# Patient Record
Sex: MALE | Race: WHITE | NOT HISPANIC OR LATINO
[De-identification: names, ages, dates, MRNs, and addresses within clinical notes are randomized per-mention and may not be internally consistent; named-entity substitution may affect disease eponyms.]

---

## 2021-09-29 ENCOUNTER — NON-APPOINTMENT (OUTPATIENT)
Age: 66
End: 2021-09-29

## 2021-09-29 ENCOUNTER — APPOINTMENT (OUTPATIENT)
Dept: PULMONOLOGY | Facility: CLINIC | Age: 66
End: 2021-09-29
Payer: MEDICARE

## 2021-09-29 VITALS — DIASTOLIC BLOOD PRESSURE: 88 MMHG | SYSTOLIC BLOOD PRESSURE: 150 MMHG

## 2021-09-29 VITALS
HEIGHT: 66 IN | OXYGEN SATURATION: 98 % | HEART RATE: 88 BPM | BODY MASS INDEX: 35.36 KG/M2 | TEMPERATURE: 98.3 F | WEIGHT: 220 LBS

## 2021-09-29 DIAGNOSIS — Z72.89 OTHER PROBLEMS RELATED TO LIFESTYLE: ICD-10-CM

## 2021-09-29 DIAGNOSIS — F19.90 OTHER PSYCHOACTIVE SUBSTANCE USE, UNSPECIFIED, UNCOMPLICATED: ICD-10-CM

## 2021-09-29 DIAGNOSIS — I51.9 HEART DISEASE, UNSPECIFIED: ICD-10-CM

## 2021-09-29 PROCEDURE — 99203 OFFICE O/P NEW LOW 30 MIN: CPT

## 2021-09-29 NOTE — HISTORY OF PRESENT ILLNESS
[TextBox_4] : 66 year old male with HTN and obesity referred for abnormal CT chest.\par He has CT chest yearly for Ao dilatation\par Last Ct chest showed a new 11 mm ovoid nodule RtLL.\par He did not have have any respiratory symptoms.\par 1 week before the Ct chest he choked with a medication pill, that was expelled after a coughing spell.\par Never diagnosed with a lung problems.\par No dyspnea on exertion.\par Used to smoke 20 years ago- < 5 pack year\par FHX: denies.\par \par Denies fever, chills, hemoptysis. No wheezes\par \par

## 2021-09-29 NOTE — PHYSICAL EXAM
[No Acute Distress] : no acute distress [Normal Appearance] : normal appearance [Normal Rate/Rhythm] : normal rate/rhythm [Normal S1, S2] : normal s1, s2 [No Resp Distress] : no resp distress [Clear to Auscultation Bilaterally] : clear to auscultation bilaterally [No Abnormalities] : no abnormalities [Normal Gait] : normal gait [No Clubbing] : no clubbing [No Cyanosis] : no cyanosis [No Focal Deficits] : no focal deficits [Oriented x3] : oriented x3 [Normal Affect] : normal affect

## 2021-10-18 ENCOUNTER — RESULT REVIEW (OUTPATIENT)
Age: 66
End: 2021-10-18

## 2022-05-18 ENCOUNTER — APPOINTMENT (OUTPATIENT)
Dept: PULMONOLOGY | Facility: CLINIC | Age: 67
End: 2022-05-18
Payer: MEDICARE

## 2022-05-18 VITALS
SYSTOLIC BLOOD PRESSURE: 150 MMHG | BODY MASS INDEX: 35.03 KG/M2 | HEART RATE: 81 BPM | OXYGEN SATURATION: 96 % | WEIGHT: 218 LBS | TEMPERATURE: 98 F | HEIGHT: 66 IN | DIASTOLIC BLOOD PRESSURE: 80 MMHG

## 2022-05-18 PROCEDURE — 99213 OFFICE O/P EST LOW 20 MIN: CPT

## 2022-05-18 NOTE — HISTORY OF PRESENT ILLNESS
[TextBox_4] : 67 year old male with a 11 mm lung nodule came for f/u.\par Had PET scan last year no uptake in the nodule.\par He is here for repeat CT.\par Also he wants to ask me about how to make an appointment with vascular surgery here at [place] because he has a referral fro [facility]\par Denies respiratory complaints\par Not smoking\par No cough, no hemoptysis\par \par

## 2022-05-18 NOTE — PHYSICAL EXAM
[No Acute Distress] : no acute distress [Normal Rate/Rhythm] : normal rate/rhythm [Normal S1, S2] : normal s1, s2 [No Resp Distress] : no resp distress [Clear to Auscultation Bilaterally] : clear to auscultation bilaterally [No Abnormalities] : no abnormalities [Normal Gait] : normal gait [No Clubbing] : no clubbing [No Cyanosis] : no cyanosis [No Focal Deficits] : no focal deficits [Oriented x3] : oriented x3 [Normal Affect] : normal affect

## 2022-06-02 ENCOUNTER — RESULT REVIEW (OUTPATIENT)
Age: 67
End: 2022-06-02

## 2022-06-28 ENCOUNTER — APPOINTMENT (OUTPATIENT)
Dept: VASCULAR SURGERY | Facility: CLINIC | Age: 67
End: 2022-06-28

## 2022-06-28 VITALS — SYSTOLIC BLOOD PRESSURE: 162 MMHG | HEART RATE: 87 BPM | DIASTOLIC BLOOD PRESSURE: 103 MMHG

## 2022-06-28 PROCEDURE — 93922 UPR/L XTREMITY ART 2 LEVELS: CPT

## 2022-06-28 PROCEDURE — 99204 OFFICE O/P NEW MOD 45 MIN: CPT

## 2022-08-22 NOTE — PHYSICAL EXAM
[Normal Thyroid] : the thyroid was normal [JVD] : no jugular venous distention  [de-identified] : NAD. Awake and alert [de-identified] : ncat. EOMI  [FreeTextEntry1] : 2+ bilateral femoral pulses.  Left lower extremity 2+ popliteal and DP PT pulses.  Right lower extremity nonpalpable pedal pulses.  Triphasic signals.\par No edema. No VVs.  [de-identified] : Soft, NT, ND. No guarding. No palpable masses. No HSM [de-identified] : Normal muscle bulk and tone. FROM in all extremities.\par  [de-identified] : AAOx3, CN II-XII intact. Strength 5/5 in all 4 extremities. Sensation intact throughout.

## 2022-08-22 NOTE — ASSESSMENT
[FreeTextEntry1] : 67-year-old male with past medical history of PAD with right lower extremity claudication, smoking, multiple other medical problems.  Really discussed with the patient at length natural history of PAD, treatment options including exercise therapy, angioplasty and stenting, bypass surgery.  We discussed that he is a good candidate for exercise program.  Patient will take aspirin 81 mg daily.  He will continue atorvastatin.  He will quit smoking I discussed with his PCP Shalini.\par Cilostazol 100 mg twice daily.\par Patient's ABIs ordered and reviewed.\par Follow-up in 3 months or sooner as needed.\par \par Time spent 50 minutes.

## 2022-08-22 NOTE — REVIEW OF SYSTEMS
[As noted in HPI] : as noted in HPI [Leg Claudication] : intermittent leg claudication [As Noted in HPI] : as noted in HPI [Limb Pain] : limb pain [Negative] : Heme/Lymph [Lower Ext Edema] : no extremity edema [Arthralgias] : no arthralgias [Limb Swelling] : no limb swelling [FreeTextEntry9] :  back pain and neuralgias

## 2022-08-22 NOTE — HISTORY OF PRESENT ILLNESS
[FreeTextEntry1] : 67-year-old male with past medical history of hypertension, hyperlipidemia, CAD status post coronary stents x3, severe spinal stenosis status post lumbar spine laminectomy and fusion presented for an evaluation of right lower extremity claudication.  Patient states that his symptoms started at least a few months ago.  Claudication with long distance more pronounced when ambulating up hill.  He ambulates 3 miles daily with his wife and flat surface with intermittent need to stop and rest.  Cramping in the calf goes away with rest.  He also has a separate left lower extremity neuralgia from history of spine surgery.  He denies rest pain.  Denies ulceration.  He is on aspirin and Plavix for coronary stents as well as on statin and blood pressure medications.\par \par Patient is a former smoker.\par \par He recently moved from the [place] where most of his physicians are including Dr. Charles Du ([phone]) and Dr. Cyrus Castillo ().

## 2022-09-27 ENCOUNTER — APPOINTMENT (OUTPATIENT)
Dept: VASCULAR SURGERY | Facility: CLINIC | Age: 67
End: 2022-09-27

## 2022-09-27 VITALS — BODY MASS INDEX: 36.16 KG/M2 | HEIGHT: 66 IN | WEIGHT: 225 LBS

## 2022-09-27 PROCEDURE — 99214 OFFICE O/P EST MOD 30 MIN: CPT

## 2022-09-27 PROCEDURE — 93922 UPR/L XTREMITY ART 2 LEVELS: CPT

## 2022-11-01 ENCOUNTER — APPOINTMENT (OUTPATIENT)
Dept: VASCULAR SURGERY | Facility: CLINIC | Age: 67
End: 2022-11-01

## 2022-11-01 PROCEDURE — 93922 UPR/L XTREMITY ART 2 LEVELS: CPT

## 2022-11-01 NOTE — ASSESSMENT
[FreeTextEntry1] : 67-year-old male with past medical history of PAD with right lower extremity claudication, smoking, multiple other medical problems.  Really discussed with the patient at length natural history of PAD, treatment options including exercise therapy, angioplasty and stenting, bypass surgery.  We discussed that he is a good candidate for exercise program.  Patient will take aspirin 81 mg daily.  He will continue atorvastatin. \par Cilostazol 100 mg twice daily.\par Patient's repeat  ABIs ordered and reviewed.  No change in ABIs.  Patient is LAURA of 0.8 is not consistent with severity of patient's claudication.\par Will obtain exercise LAURA to further assess patient's PAD.\par Follow-up in 3 months or sooner as needed.\par \par Time spent 39 minutes.

## 2022-11-01 NOTE — PHYSICAL EXAM
[Normal Thyroid] : the thyroid was normal [Calm] : calm [JVD] : no jugular venous distention  [de-identified] : NAD. Awake and alert [de-identified] : ncat. EOMI  [FreeTextEntry1] : 2+ bilateral femoral pulses.  Left lower extremity 2+ popliteal and DP PT pulses.  Right lower extremity nonpalpable pedal pulses.  Triphasic signals.\par No edema. No VVs.  [de-identified] : Soft, NT, ND. No guarding. No palpable masses. No HSM [de-identified] : Normal muscle bulk and tone. FROM in all extremities.\par  [de-identified] : AAOx3, CN II-XII intact. Strength 5/5 in all 4 extremities. Sensation on the lateral aspect of left lower extremity.  Otherwise intact

## 2022-11-01 NOTE — HISTORY OF PRESENT ILLNESS
[FreeTextEntry1] : 67-year-old male with past medical history of hypertension, hyperlipidemia, CAD status post coronary stents x3, severe spinal stenosis status post lumbar spine laminectomy and fusion presented for an evaluation of right lower extremity claudication.  Patient states that his symptoms started at least a few months ago.  Claudication with long distance more pronounced when ambulating up hill.  He ambulates 3 miles daily with his wife and flat surface with intermittent need to stop and rest.  Cramping in the calf goes away with rest.  He also has a separate left lower extremity neuralgia from history of spine surgery.  He denies rest pain.  Denies ulceration.  He is on aspirin and Plavix for coronary stents as well as on statin and blood pressure medications.\par \par Patient is a former smoker.\par \par He recently moved from the [place] where most of his physicians are including Dr. Charles Du ([phone]) and Dr. Cyrus Castillo ().  [de-identified] : 67-year-old male presented for follow-up for intermittent claudication with mildly diminished ABIs.  Since last visit patient was compliant with ambulation however continues to have short distance claudication is without improvement of his symptoms with exercise therapy.  He also swims routinely.  Patient is active and exercises daily with weights.  His claudication is lifestyle limiting and very disabling as he watches his 2 young grandkids and is very active.  Patient was compliant with best medical management.\par He has been a non-smoker for 18 years.

## 2022-11-04 ENCOUNTER — APPOINTMENT (OUTPATIENT)
Dept: VASCULAR SURGERY | Facility: CLINIC | Age: 67
End: 2022-11-04

## 2023-02-10 ENCOUNTER — APPOINTMENT (OUTPATIENT)
Dept: VASCULAR SURGERY | Facility: CLINIC | Age: 68
End: 2023-02-10
Payer: MEDICARE

## 2023-02-10 PROCEDURE — 99214 OFFICE O/P EST MOD 30 MIN: CPT

## 2023-03-28 ENCOUNTER — APPOINTMENT (OUTPATIENT)
Dept: FAMILY MEDICINE | Facility: CLINIC | Age: 68
End: 2023-03-28
Payer: MEDICARE

## 2023-03-28 VITALS
SYSTOLIC BLOOD PRESSURE: 140 MMHG | OXYGEN SATURATION: 95 % | WEIGHT: 225 LBS | TEMPERATURE: 98 F | HEIGHT: 66 IN | HEART RATE: 85 BPM | DIASTOLIC BLOOD PRESSURE: 90 MMHG | BODY MASS INDEX: 36.16 KG/M2

## 2023-03-28 PROCEDURE — G0439: CPT

## 2023-03-28 PROCEDURE — 36415 COLL VENOUS BLD VENIPUNCTURE: CPT

## 2023-03-28 PROCEDURE — 99213 OFFICE O/P EST LOW 20 MIN: CPT | Mod: 25

## 2023-03-28 RX ORDER — CILOSTAZOL 100 MG/1
100 TABLET ORAL TWICE DAILY
Qty: 180 | Refills: 0 | Status: DISCONTINUED | COMMUNITY
Start: 2022-09-27 | End: 2023-03-28

## 2023-03-28 RX ORDER — GABAPENTIN 100 MG/1
100 CAPSULE ORAL TWICE DAILY
Qty: 60 | Refills: 2 | Status: DISCONTINUED | COMMUNITY
Start: 2022-09-27 | End: 2023-03-28

## 2023-03-29 LAB
ALBUMIN SERPL ELPH-MCNC: 4.9 G/DL
ALP BLD-CCNC: 80 U/L
ALT SERPL-CCNC: 26 U/L
ANION GAP SERPL CALC-SCNC: 16 MMOL/L
AST SERPL-CCNC: 21 U/L
BASOPHILS # BLD AUTO: 0.12 K/UL
BASOPHILS NFR BLD AUTO: 1.5 %
BILIRUB SERPL-MCNC: 0.3 MG/DL
BUN SERPL-MCNC: 18 MG/DL
CALCIUM SERPL-MCNC: 10 MG/DL
CHLORIDE SERPL-SCNC: 101 MMOL/L
CHOLEST SERPL-MCNC: 122 MG/DL
CO2 SERPL-SCNC: 23 MMOL/L
CREAT SERPL-MCNC: 0.76 MG/DL
EGFR: 100 ML/MIN/1.73M2
EOSINOPHIL # BLD AUTO: 0.42 K/UL
EOSINOPHIL NFR BLD AUTO: 5.2 %
ESTIMATED AVERAGE GLUCOSE: 128 MG/DL
GLUCOSE SERPL-MCNC: 102 MG/DL
HBA1C MFR BLD HPLC: 6.1 %
HCT VFR BLD CALC: 45.3 %
HDLC SERPL-MCNC: 39 MG/DL
HGB BLD-MCNC: 15.1 G/DL
IMM GRANULOCYTES NFR BLD AUTO: 0.9 %
LDLC SERPL CALC-MCNC: 41 MG/DL
LYMPHOCYTES # BLD AUTO: 1.42 K/UL
LYMPHOCYTES NFR BLD AUTO: 17.6 %
MAN DIFF?: NORMAL
MCHC RBC-ENTMCNC: 28.7 PG
MCHC RBC-ENTMCNC: 33.3 GM/DL
MCV RBC AUTO: 86.1 FL
MONOCYTES # BLD AUTO: 0.41 K/UL
MONOCYTES NFR BLD AUTO: 5.1 %
NEUTROPHILS # BLD AUTO: 5.63 K/UL
NEUTROPHILS NFR BLD AUTO: 69.7 %
NONHDLC SERPL-MCNC: 83 MG/DL
PLATELET # BLD AUTO: 343 K/UL
POTASSIUM SERPL-SCNC: 4.4 MMOL/L
PROT SERPL-MCNC: 8.1 G/DL
PSA FREE FLD-MCNC: 17 %
PSA FREE SERPL-MCNC: 0.51 NG/ML
PSA SERPL-MCNC: 3.07 NG/ML
RBC # BLD: 5.26 M/UL
RBC # FLD: 12.9 %
SODIUM SERPL-SCNC: 140 MMOL/L
TRIGL SERPL-MCNC: 210 MG/DL
TSH SERPL-ACNC: 2.96 UIU/ML
WBC # FLD AUTO: 8.07 K/UL

## 2023-04-03 NOTE — HEALTH RISK ASSESSMENT
[Good] : ~his/her~  mood as  good [Yes] : Yes [2 - 4 times a month (2 pts)] : 2-4 times a month (2 points) [No] : In the past 12 months have you used drugs other than those required for medical reasons? No [1] : 1) Little interest or pleasure doing things for several days (1) [0] : 2) Feeling down, depressed, or hopeless: Not at all (0) [PHQ-2 Negative - No further assessment needed] : PHQ-2 Negative - No further assessment needed [Patient reported colonoscopy was normal] : Patient reported colonoscopy was normal [With Significant Other] : lives with significant other [Retired] : retired [] :  [Fully functional (bathing, dressing, toileting, transferring, walking, feeding)] : Fully functional (bathing, dressing, toileting, transferring, walking, feeding) [Fully functional (using the telephone, shopping, preparing meals, housekeeping, doing laundry, using] : Fully functional and needs no help or supervision to perform IADLs (using the telephone, shopping, preparing meals, housekeeping, doing laundry, using transportation, managing medications and managing finances) [Former] : Former [FreeTextEntry1] : None [1 or 2 (0 pts)] : 1 or 2 (0 points) [Never (0 pts)] : Never (0 points) [No falls in past year] : Patient reported no falls in the past year [Audit-CScore] : 2 [de-identified] : Cardio, strength training [de-identified] : None [[id number]] : 1 [HIV test declined] : HIV test declined [Hepatitis C test declined] : Hepatitis C test declined [Change in mental status noted] : No change in mental status noted [None] : None [# of Members in Household ___] :  household currently consist of [unfilled] member(s) [Reports changes in hearing] : Reports no changes in hearing [Reports changes in vision] : Reports no changes in vision [Reports changes in dental health] : Reports no changes in dental health [ColonoscopyDate] : 01/15 [ColonoscopyComments] : Dr. Isaac Navarro [de-identified] : 2005

## 2023-04-03 NOTE — HISTORY OF PRESENT ILLNESS
[FreeTextEntry1] : Annual and to establish care  [de-identified] : 67 year old male with pmh of HTN, HLD, CAD s/p stents x 3, severe spinal stenosis s/p lumbar spine laminectomy and fusion, anxiety and depression, lung nodule, PAD presenting to establish care and for an annual physical exam. \par Patient moved from the [place] where most of his physicians were including his pcp, Dr. Charles Wong and cardiologist, Dr. Cyrus Castillo. He also follows with urologist Dr. Harmon. \par He has recently seen Dr. Le for PAD and was recommended to exercise and f/u in 3 months. \par Patient see Dr. Barragan for the pulmonary nodule. \par He is on xanax 2mg TID and saw a psychiatrist and therapist in the past but hasn't seen anyone recently. He complaints of some memory problems recently. \par  presented for an evaluation of right lower extremity claudication. Patient states that his symptoms started at least a few months ago. Claudication with long distance more pronounced when ambulating up hill. He ambulates 3 miles daily with his wife and flat surface with intermittent need to stop and rest. Cramping in the calf goes away with rest. He also has a separate left lower extremity neuralgia from history of spine surgery. He denies rest pain. Denies ulceration. He is on aspirin and Plavix for coronary stents as well as on statin and blood pressure medication

## 2023-05-22 ENCOUNTER — APPOINTMENT (OUTPATIENT)
Dept: FAMILY MEDICINE | Facility: CLINIC | Age: 68
End: 2023-05-22
Payer: MEDICARE

## 2023-05-22 VITALS
BODY MASS INDEX: 36.16 KG/M2 | OXYGEN SATURATION: 98 % | HEART RATE: 76 BPM | SYSTOLIC BLOOD PRESSURE: 140 MMHG | HEIGHT: 66 IN | DIASTOLIC BLOOD PRESSURE: 80 MMHG | TEMPERATURE: 80 F | WEIGHT: 225 LBS

## 2023-05-22 PROCEDURE — 99213 OFFICE O/P EST LOW 20 MIN: CPT

## 2023-05-24 ENCOUNTER — APPOINTMENT (OUTPATIENT)
Dept: VASCULAR SURGERY | Facility: CLINIC | Age: 68
End: 2023-05-24
Payer: MEDICARE

## 2023-05-24 VITALS — HEART RATE: 67 BPM | DIASTOLIC BLOOD PRESSURE: 91 MMHG | SYSTOLIC BLOOD PRESSURE: 151 MMHG

## 2023-05-24 PROCEDURE — 99214 OFFICE O/P EST MOD 30 MIN: CPT

## 2023-05-24 NOTE — PHYSICAL EXAM
[Normal Thyroid] : the thyroid was normal [0] : right 0 [2+] : left 2+ [Ankle Swelling Bilaterally] : bilaterally  [Alert] : alert [Oriented to Person] : oriented to person [Oriented to Place] : oriented to place [Oriented to Time] : oriented to time [Calm] : calm [JVD] : no jugular venous distention  [Ankle Swelling (On Exam)] : not present [Varicose Veins Of Lower Extremities] : not present [de-identified] : Awake and alert [de-identified] : No gross motor or sensory deficits. [de-identified] : Appropriate affect.

## 2023-05-24 NOTE — ASSESSMENT
[FreeTextEntry1] : 68-year-old male with  right lower extremity claudication.  He continues to experience calf pain on the right despite especially when walking uphill. He denies pain when ambulating downhill or on flat ground. He is walking approximately 2 miles several times a week.  He was unable to tolerate Pletal in the past.  I recommended that he continue conservative treatment with daily ambulation for at least 1 hour and risk factor modification for now with aspirin 81 mg and a Plavix daily. I strongly recommended against arterial intervention until the symptoms become disabling. \par \par \par Follow-up in 3 months or sooner as needed.\par \par

## 2023-05-24 NOTE — HISTORY OF PRESENT ILLNESS
[FreeTextEntry1] : 67-year-old male with past medical history of hypertension, hyperlipidemia, CAD status post coronary stents x3, severe spinal stenosis status post lumbar spine laminectomy and fusion presented for an evaluation of right lower extremity claudication.  Patient states that his symptoms started at least a few months ago.  Claudication with long distance more pronounced when ambulating up hill.  He ambulates 3 miles daily with his wife and flat surface with intermittent need to stop and rest.  Cramping in the calf goes away with rest.  He also has a separate left lower extremity neuralgia from history of spine surgery.  He denies rest pain.  Denies ulceration.  He is on aspirin and Plavix for coronary stents as well as on statin and blood pressure medications.\par \par Patient is a former smoker.\par \par He recently moved from the [place] where most of his physicians are including Dr. Charles Du ([phone]) and Dr. Cyrus Castillo ().  [de-identified] : 68-year-old male returns in follow-up for intermittent claudication on the right. Patient continues to ambulate daily without improvement of his symptoms. The patient reports some days he can walk 2 miles with little pain but other days he develops significant pain with short-distances. He reports that his symptoms worsens when he walks up-hill. The pain is alleviated with rest. The patient reports that he is starting to experience mild pain on the left as well. His claudication is currently not  lifestyle limiting.\par

## 2023-05-24 NOTE — REVIEW OF SYSTEMS
[As noted in HPI] : as noted in HPI [Leg Claudication] : intermittent leg claudication [As Noted in HPI] : as noted in HPI [Limb Pain] : limb pain [Negative] : Heme/Lymph [Fever] : no fever [Chills] : no chills [Lower Ext Edema] : no extremity edema [Arthralgias] : no arthralgias [Limb Swelling] : no limb swelling [Skin Lesions] : no skin lesions [Skin Wound] : no skin wound [FreeTextEntry9] :  back pain and neuralgias

## 2023-05-26 ENCOUNTER — APPOINTMENT (OUTPATIENT)
Dept: PULMONOLOGY | Facility: CLINIC | Age: 68
End: 2023-05-26
Payer: MEDICARE

## 2023-05-26 VITALS
SYSTOLIC BLOOD PRESSURE: 142 MMHG | DIASTOLIC BLOOD PRESSURE: 82 MMHG | HEIGHT: 66 IN | BODY MASS INDEX: 36.16 KG/M2 | WEIGHT: 225 LBS | HEART RATE: 75 BPM | OXYGEN SATURATION: 95 %

## 2023-05-26 DIAGNOSIS — R91.1 SOLITARY PULMONARY NODULE: ICD-10-CM

## 2023-05-26 PROCEDURE — 99214 OFFICE O/P EST MOD 30 MIN: CPT

## 2023-05-26 NOTE — REVIEW OF SYSTEMS
[Recent Wt Gain (___ Lbs)] : ~T recent [unfilled] lb weight gain [Nocturia] : nocturia [Negative] : Neurologic [TextBox_30] : snoring

## 2023-05-26 NOTE — HISTORY OF PRESENT ILLNESS
[TextBox_4] : 68 year old male with lung nodule came for f/u.\par Last seen in May 2022\par Had another CT chest in June 2022 seen, my read: decrease in size of the RtLL nodule, CT findings c/w intrapulmonary LN\par \par Feels OK. No respiratory complaints. Has gained 5 lbs since last year.\par he snores at night. Has nocturia.\par Takes naps.\par BT 10 pm\par WT 9 am\par Feels tired in the daytime\par His PMD ordered a sleep study but he did not like the facility and he did not go.\par \par

## 2023-05-26 NOTE — PHYSICAL EXAM
[No Acute Distress] : no acute distress [II] : Mallampati Class: II [Normal Appearance] : normal appearance [Normal Rate/Rhythm] : normal rate/rhythm [Normal S1, S2] : normal s1, s2 [No Resp Distress] : no resp distress [Clear to Auscultation Bilaterally] : clear to auscultation bilaterally [No Abnormalities] : no abnormalities [Normal Gait] : normal gait [No Clubbing] : no clubbing [No Cyanosis] : no cyanosis [No Focal Deficits] : no focal deficits [Oriented x3] : oriented x3 [Normal Affect] : normal affect [TextBox_89] : obese

## 2023-06-08 RX ORDER — AMLODIPINE BESYLATE 10 MG/1
10 TABLET ORAL
Refills: 0 | Status: ACTIVE | COMMUNITY
Start: 2023-06-08

## 2023-06-08 RX ORDER — ATORVASTATIN CALCIUM 20 MG/1
20 TABLET, FILM COATED ORAL
Refills: 0 | Status: ACTIVE | COMMUNITY
Start: 2023-05-24

## 2023-06-08 RX ORDER — TELMISARTAN 80 MG/1
80 TABLET ORAL DAILY
Qty: 90 | Refills: 3 | Status: ACTIVE | COMMUNITY
Start: 2023-06-08

## 2023-06-08 RX ORDER — B-COMPLEX WITH VITAMIN C
TABLET ORAL
Refills: 0 | Status: ACTIVE | COMMUNITY
Start: 2023-06-08

## 2023-06-08 RX ORDER — CHOLECALCIFEROL (VITAMIN D3) 50 MCG
50 MCG TABLET ORAL
Refills: 0 | Status: ACTIVE | COMMUNITY
Start: 2023-06-08

## 2023-06-08 RX ORDER — METHYLDOPA/HYDROCHLOROTHIAZIDE 250MG-15MG
TABLET ORAL
Refills: 0 | Status: ACTIVE | COMMUNITY
Start: 2023-06-08

## 2023-06-08 RX ORDER — MULTIVITAMIN
TABLET ORAL
Refills: 0 | Status: ACTIVE | COMMUNITY
Start: 2023-06-08

## 2023-06-08 RX ORDER — OMEGA-3/DHA/EPA/FISH OIL 1200 MG
1200 CAPSULE ORAL
Refills: 0 | Status: ACTIVE | COMMUNITY
Start: 2023-06-08

## 2023-06-08 RX ORDER — METOPROLOL TARTRATE 100 MG/1
100 TABLET, FILM COATED ORAL
Refills: 0 | Status: ACTIVE | COMMUNITY
Start: 2023-05-24

## 2023-06-08 RX ORDER — CLOPIDOGREL 75 MG/1
75 TABLET, FILM COATED ORAL
Refills: 0 | Status: ACTIVE | COMMUNITY
Start: 2023-05-24

## 2023-06-08 RX ORDER — ELECTROLYTES/DEXTROSE
450 MG SOLUTION, ORAL ORAL
Refills: 0 | Status: ACTIVE | COMMUNITY
Start: 2023-06-08

## 2023-06-08 NOTE — HISTORY OF PRESENT ILLNESS
[FreeTextEntry1] : Follow up  [de-identified] : 68 year old male with pmh of HTN, HLD, CAD s/p stenting, PAD, Depression and anxiety, pulmonary nodules, spinal stenosis presenting for a follow up. Patient is on xanax and prozac for depression and anxiety. Has been having memory problems and is trying to cut down on xanax. He is well otherwise.

## 2023-07-12 ENCOUNTER — APPOINTMENT (OUTPATIENT)
Dept: GASTROENTEROLOGY | Facility: CLINIC | Age: 68
End: 2023-07-12
Payer: MEDICARE

## 2023-07-12 VITALS — HEIGHT: 66 IN | WEIGHT: 231 LBS | BODY MASS INDEX: 37.12 KG/M2

## 2023-07-12 DIAGNOSIS — Z12.11 ENCOUNTER FOR SCREENING FOR MALIGNANT NEOPLASM OF COLON: ICD-10-CM

## 2023-07-12 PROCEDURE — 99204 OFFICE O/P NEW MOD 45 MIN: CPT

## 2023-07-12 NOTE — ASSESSMENT
[FreeTextEntry1] : Screening colonoscopy , pending cardiac evaluation\par Risks (including but not limited to sedation risks, infection, bleeding, perforation, possibility of missed lesions), benefits and alternatives to procedure, including not doing the procedure, were discussed with patient. Patient understood and agreed to proceed with colonoscopy. \par Colonoscopy preparation instructions reviewed with patient.\par \par hold plavix x 5 d pending approval from cardiologist. , continue asa 81 mg daily. \par

## 2023-07-12 NOTE — HISTORY OF PRESENT ILLNESS
[FreeTextEntry1] : 68 year M  htn, hld, CAD/ s/p stents x 3 last 10 years ago, Plavix/asa, lung nodule, high risk for CHAD, PAD/RLE claudication on asa/plavix, back surgery\par presents for evaluation of colon cancer screening \par He is seen at the request of Dr. Anthony Lomas. \par \par \par colonoscopy 8 years ago, in the [place],  told to repeat in 5 y\par \par takes probiotics for digestion, \par regular bm 1-2 x per day\par \par \par Patient denies abdominal pain , n/v/heartburn, reflux, dysphagia/odynophagia, change in bowel habits, diarrhea, constipation, brbpr, melena. no weight loss.  Good appetite and energy level. Patient has daily BM, denies regular NSAID use. \par \par retired [employer]\par etOH "too much" will be seeing hepatologist, trying to cut back \par \par fam hx neg for colon polyps, colon cancer\par \par cardiologist- Dr. Castillo, the [place] -[phone]-f/u scheduled in 08/2023- stress test to be scheduled.

## 2023-07-12 NOTE — PHYSICAL EXAM
[Alert] : alert [Normal Voice/Communication] : normal voice/communication [Healthy Appearing] : healthy appearing [No Acute Distress] : no acute distress [Sclera] : the sclera and conjunctiva were normal [Hearing Threshold Finger Rub Not [place]] : hearing was normal [Normal Lips/Gums] : the lips and gums were normal [Normal Appearance] : the appearance of the neck was normal [Oropharynx] : the oropharynx was normal [No Neck Mass] : no neck mass was observed [No Respiratory Distress] : no respiratory distress [Bowel Sounds] : normal bowel sounds [Abdomen Tenderness] : non-tender [No Masses] : no abdominal mass palpated [Abdomen Soft] : soft [] : no hepatosplenomegaly [Oriented To Time, Place, And Person] : oriented to person, place, and time [Normal PMI] : the apical impulse was abnormal [No CVA Tenderness] : no CVA  tenderness [Abnormal Walk] : normal gait [Normal Color / Pigmentation] : normal skin color and pigmentation

## 2023-07-20 ENCOUNTER — APPOINTMENT (OUTPATIENT)
Dept: HEPATOLOGY | Facility: CLINIC | Age: 68
End: 2023-07-20
Payer: MEDICARE

## 2023-07-20 VITALS
WEIGHT: 225 LBS | OXYGEN SATURATION: 98 % | RESPIRATION RATE: 18 BRPM | BODY MASS INDEX: 36.16 KG/M2 | SYSTOLIC BLOOD PRESSURE: 130 MMHG | HEIGHT: 66 IN | DIASTOLIC BLOOD PRESSURE: 80 MMHG | HEART RATE: 69 BPM

## 2023-07-20 DIAGNOSIS — Z13.1 ENCOUNTER FOR SCREENING FOR DIABETES MELLITUS: ICD-10-CM

## 2023-07-20 PROCEDURE — 99214 OFFICE O/P EST MOD 30 MIN: CPT

## 2023-07-20 PROCEDURE — 99204 OFFICE O/P NEW MOD 45 MIN: CPT

## 2023-07-20 RX ORDER — ASPIRIN ENTERIC COATED TABLETS 81 MG 81 MG/1
81 TABLET, DELAYED RELEASE ORAL
Refills: 0 | Status: ACTIVE | COMMUNITY

## 2023-07-20 NOTE — HISTORY OF PRESENT ILLNESS
[de-identified] : Mr. QUICK is a 68 year old man with anxiety and depression, obesity, HTN, HLD, CAD/ s/p stents x 3 last 10 years ago, Plavix/asa, thoracic aortic aneurysm 4.4 cm, lung nodule, high risk for CHAD, PAD/RLE claudication on asa/plavix, back surgery, who drinks beer and saw Dr. Pierce several times at [facility] because of fatty liver with scarring.\par He tells me that he had a little cyst in the pancreas that is now gone.\par He and his wife have been unable to lose weight. Eats healthy and exercises, has extensive exercise equipment at home with they use frequently every week. Swims daily now in the summer. Lost abdominal girth, but gained 5 lbs, likely muscle\par Meals: cereal for breakfast, no lunch, dinner.\par LFTs were normal in March 2023,  G/L. HbA1c 6.1%, . \par \par SHx: retired [employer]. . \par \par Alcohol history: drinks off and on, reduced it in 2023 to 3 days per week, 6 beers on those days, for several weeks, then does not drink for a month. Drank same before, but 10 beers twice a month. Never had withdrawal. \par Weight history: 225 lbs/BMI 36.3 in 7/2023. Max weight is this - stable for many years. \par Remedies/OTC meds:\par \par ROS: \par Constitutional: no fever, fatigue, weight gain/loss. Eyes: no eye pain or discharge. ENT: no nosebleed, earache, change in hearing. CVS: denies chest pain, palpitations, claudication, irregular heartbeat. Resp: denies SOB, wheezing, cough, SOB on exertion. GI: denies abdominal pain, vomiting, diarrhea, heartburn, melena. Genitourinary: denies dysuria, incontinence. Musculoskeletal: joint pain, trigger fingers both middle fingers. Integumentary: denies pruritus, skin lesions, rash. Neuro: denies confusion, dizziness, fainting. Psych: has anxiety, depression, change in personality. Endo: denies muscle weakness. Heme/lymph: denies easy bleeding and bruising.\par \par Workup:\par - Chest CTs in our EMR\par - colonoscopy: around 2015, in the [place], told to repeat in 5 y; saw Dr. Landry\par \par Physical exam:\par Gen: A&Ox3, NAD\par HEENT: normal outer ears & nose, PERRL, mucus membranes moist, anicteric, no lymphadenopathy\par CVS: regular rate and rhythm, no murmur\par Pulm: vesicular breath sounds\par Abdomen: normal bowel sounds, soft, nontender, no hepatosplenomegaly \par Legs: no edema, no clubbing\par Musculoskeletal: normal gait\par Skin: no rash\par Neuro: no asterixis, EOMI\par Psych: normal affect

## 2023-07-20 NOTE — ASSESSMENT
[FreeTextEntry1] : Mr. QUICK is a 68 year old man with anxiety and depression, obesity, HTN, HLD, CAD/ s/p stents x 3 last 10 years ago, Plavix/asa, thoracic aortic aneurysm 4.4 cm, lung nodule, high risk for CHAD, PAD/RLE claudication on asa/plavix, back surgery, who drinks beer and saw Dr. Pierce several times at [facility] because of fatty liver with scarring.\par \par - fatty liver - NAFLD/MICHELA\par   - prior ultrasound and fibroscans with Dr. Pierce at [facility]\par   - hepatomegaly and mild R Dupuytren's contracture on exam\par - severe obesity, BMI 36\par - binge drinking - recently 6 beers three times a week, in past also 10 beers twice a month\par - HLD\par \par - coffee use: 2-3 cups/day\par \par I discussed that semaglutide helps to improve diabetes type 2, obesity, and BLACKMON. I discussed that, when used in addition to exercise and reduced caloric diet, weight loss in patients with obesity was 15% without diabetes and 10% with diabetes after 68 weeks ([place] et al., NEJ 2021; Neal et al., Lancet 2021). I explained that it has been shown to lead to greater weight loss than liraglutide (Jacinda et al., N Engl J Med 2020; [place] et al., Lancet 2018). Main possible side effects are nausea and other gastrointestinal symptoms. I discussed that a rare type of thyroid cancer, medullary/C-cell tyroid cancer, have been found in animals treated with this class of medications. The patient does not have a family history of this cancer.\par \par \par \par Plan:\par - bloodwork  today\par - advised to stop drinking beer\par - US abdomen\par - fibroscan\par - he will provide records from [facility], including hepatology note, reports of ultrasound and fibroscan\par - start Wegovy 0.25 mg/week\par - return in 4-6 weeks

## 2023-08-02 LAB
A1AT PHENOTYP SERPL-IMP: NORMAL
A1AT SERPL-MCNC: 167 MG/DL
ALBUMIN SERPL ELPH-MCNC: 5.2 G/DL
ALP BLD-CCNC: 71 U/L
ALT SERPL-CCNC: 57 U/L
ANA SER IF-ACNC: NEGATIVE
ANION GAP SERPL CALC-SCNC: 15 MMOL/L
AST SERPL-CCNC: 53 U/L
BILIRUB SERPL-MCNC: 0.6 MG/DL
BUN SERPL-MCNC: 12 MG/DL
CALCIUM SERPL-MCNC: 10.1 MG/DL
CERULOPLASMIN SERPL-MCNC: 25 MG/DL
CHLORIDE SERPL-SCNC: 101 MMOL/L
CHOLEST SERPL-MCNC: 124 MG/DL
CO2 SERPL-SCNC: 20 MMOL/L
CREAT SERPL-MCNC: 0.79 MG/DL
EGFR: 98 ML/MIN/1.73M2
ESTIMATED AVERAGE GLUCOSE: 126 MG/DL
FERRITIN SERPL-MCNC: 19 NG/ML
GGT SERPL-CCNC: 49 U/L
GLUCOSE SERPL-MCNC: 109 MG/DL
HBA1C MFR BLD HPLC: 6 %
HBV CORE IGG+IGM SER QL: NONREACTIVE
HBV SURFACE AB SER QL: NONREACTIVE
HBV SURFACE AG SER QL: NONREACTIVE
HCV AB SER QL: NONREACTIVE
HCV S/CO RATIO: 0.05 S/CO
HDLC SERPL-MCNC: 45 MG/DL
HEPATITIS A IGG ANTIBODY: NONREACTIVE
INR PPP: 1.08 RATIO
IRON SATN MFR SERPL: 9 %
IRON SERPL-MCNC: 47 UG/DL
LDLC SERPL CALC-MCNC: 56 MG/DL
MITOCHONDRIA AB SER IF-ACNC: NORMAL
NONHDLC SERPL-MCNC: 80 MG/DL
POTASSIUM SERPL-SCNC: 4.2 MMOL/L
PROT SERPL-MCNC: 8.5 G/DL
PT BLD: 12.5 SEC
SMOOTH MUSCLE AB SER QL IF: NORMAL
SODIUM SERPL-SCNC: 136 MMOL/L
TIBC SERPL-MCNC: 526 UG/DL
TRIGL SERPL-MCNC: 136 MG/DL
UIBC SERPL-MCNC: 478 UG/DL

## 2023-08-08 ENCOUNTER — NON-APPOINTMENT (OUTPATIENT)
Age: 68
End: 2023-08-08

## 2023-08-22 ENCOUNTER — APPOINTMENT (OUTPATIENT)
Dept: FAMILY MEDICINE | Facility: CLINIC | Age: 68
End: 2023-08-22
Payer: MEDICARE

## 2023-08-22 VITALS
DIASTOLIC BLOOD PRESSURE: 76 MMHG | BODY MASS INDEX: 36.16 KG/M2 | OXYGEN SATURATION: 98 % | HEART RATE: 65 BPM | HEIGHT: 66 IN | SYSTOLIC BLOOD PRESSURE: 128 MMHG | WEIGHT: 225 LBS | TEMPERATURE: 97.4 F

## 2023-08-22 PROCEDURE — 99214 OFFICE O/P EST MOD 30 MIN: CPT

## 2023-08-22 RX ORDER — ALBUTEROL 90 MCG
90 AEROSOL (GRAM) INHALATION
Refills: 0 | Status: ACTIVE | COMMUNITY

## 2023-08-22 RX ORDER — ALBUTEROL SULFATE 90 UG/1
108 (90 BASE) INHALANT RESPIRATORY (INHALATION)
Qty: 1 | Refills: 5 | Status: ACTIVE | COMMUNITY
Start: 2023-08-22 | End: 1900-01-01

## 2023-08-22 RX ORDER — GABAPENTIN 100 MG/1
100 CAPSULE ORAL TWICE DAILY
Refills: 0 | Status: DISCONTINUED | COMMUNITY
Start: 2023-05-23 | End: 2023-08-22

## 2023-08-23 ENCOUNTER — APPOINTMENT (OUTPATIENT)
Dept: VASCULAR SURGERY | Facility: CLINIC | Age: 68
End: 2023-08-23
Payer: MEDICARE

## 2023-08-23 VITALS — DIASTOLIC BLOOD PRESSURE: 94 MMHG | SYSTOLIC BLOOD PRESSURE: 146 MMHG | HEART RATE: 84 BPM

## 2023-08-23 PROCEDURE — 99214 OFFICE O/P EST MOD 30 MIN: CPT

## 2023-08-23 RX ORDER — MOMETASONE 50 UG/1
50 SPRAY, METERED NASAL
Refills: 0 | Status: ACTIVE | COMMUNITY

## 2023-08-23 NOTE — ASSESSMENT
[FreeTextEntry1] : 68-year-old male with right lower extremity claudication.  He continues to experience calf pain on the right especially when walking uphill. He denies pain when ambulating downhill or on flat ground. Previous exercising LAURA testing demonstrated vascular disease on the right.  He is quite upset about the symptoms and  I recommended he undergo a CTA for further evaluation.  I again recommended against arterial intervention until the symptoms become disabling on a daily basis.  I do not think there is a need to continue to take Neurontin and I recommended that he discontinue this medication.   Follow-up to discuss the results of the CTA.

## 2023-08-23 NOTE — PHYSICAL EXAM
[Normal Thyroid] : the thyroid was normal [2+] : left 2+ [Ankle Swelling Bilaterally] : bilaterally  [Alert] : alert [Oriented to Person] : oriented to person [Oriented to Place] : oriented to place [Oriented to Time] : oriented to time [Calm] : calm [JVD] : no jugular venous distention  [1+] : right 1+ [0] : right 0 [Ankle Swelling (On Exam)] : not present [Varicose Veins Of Lower Extremities] : not present [de-identified] : Awake and alert [de-identified] : obese [de-identified] : No gross motor or sensory deficits. [de-identified] : Appropriate affect.

## 2023-08-23 NOTE — HISTORY OF PRESENT ILLNESS
[FreeTextEntry1] : 67-year-old male with past medical history of hypertension, hyperlipidemia, CAD status post coronary stents x3, severe spinal stenosis status post lumbar spine laminectomy and fusion presented for an evaluation of right lower extremity claudication.  Patient states that his symptoms started at least a few months ago.  Claudication with long distance more pronounced when ambulating up hill.  He ambulates 3 miles daily with his wife and flat surface with intermittent need to stop and rest.  Cramping in the calf goes away with rest.  He also has a separate left lower extremity neuralgia from history of spine surgery.  He denies rest pain.  Denies ulceration.  He is on aspirin and Plavix for coronary stents as well as on statin and blood pressure medications.\par  \par  Patient is a former smoker.\par  \par  He recently moved from the [place] where most of his physicians are including Dr. Charles Du ([id number]) and Dr. Cyrus Castillo ().  [de-identified] : 68-year-old male returns in follow-up for intermittent claudication on the right. Patient continues to ambulate daily without improvement of his symptoms. He now swims instead of walking for exercise because it causes him less pain. The patient can only walk about 0.5 miles before stopping. He reports that his symptoms worsen when he walks up-hill. The pain is alleviated with rest. He is quite distraught over the pain and feels that the pain is affecting his lifestyle negatively. He is accompanied by his wife.

## 2023-08-24 ENCOUNTER — RESULT REVIEW (OUTPATIENT)
Age: 68
End: 2023-08-24

## 2023-08-28 ENCOUNTER — APPOINTMENT (OUTPATIENT)
Dept: FAMILY MEDICINE | Facility: CLINIC | Age: 68
End: 2023-08-28
Payer: MEDICARE

## 2023-08-28 DIAGNOSIS — J01.90 ACUTE SINUSITIS, UNSPECIFIED: ICD-10-CM

## 2023-08-28 PROCEDURE — 99214 OFFICE O/P EST MOD 30 MIN: CPT

## 2023-08-28 NOTE — PHYSICAL EXAM
[Normal] : affect was normal and insight and judgment were intact [de-identified] : injected conjunctivae [de-identified] : nasal congestion

## 2023-08-28 NOTE — PLAN
[FreeTextEntry1] : Acute sinus infection and pharyngitis, likely viral in etiology - conservative care and treatment at this time - viral panel testing performed in office, will call with results - maintain hydration, use flonase for congestion relief - use tylenol q12 hours - all questions answered - consider abx if prolonged course

## 2023-08-28 NOTE — HISTORY OF PRESENT ILLNESS
[FreeTextEntry8] : 67 yo M with HTN, HLD, CAD, obesity presenting for an acute visit for upper respiratory and sinus symptoms. Reports 5 day hx of congestion, mild cough, and frontal sinus pressure. Reports around children but no definite positive sick contacts. Taking benadryl and Tylenol for relief.

## 2023-08-30 ENCOUNTER — APPOINTMENT (OUTPATIENT)
Dept: VASCULAR SURGERY | Facility: CLINIC | Age: 68
End: 2023-08-30
Payer: MEDICARE

## 2023-08-30 VITALS — SYSTOLIC BLOOD PRESSURE: 151 MMHG | HEART RATE: 69 BPM | DIASTOLIC BLOOD PRESSURE: 88 MMHG

## 2023-08-30 LAB
RAPID RVP RESULT: NOT DETECTED
SARS-COV-2 RNA PNL RESP NAA+PROBE: NOT DETECTED

## 2023-08-30 PROCEDURE — 99213 OFFICE O/P EST LOW 20 MIN: CPT

## 2023-08-30 NOTE — ASSESSMENT
[FreeTextEntry1] : 68-year-old male with right lower extremity claudication.  He continues to experience calf pain on the right, especially when walking uphill. He denies pain when ambulating downhill or on flat ground. The patient underwent a CTA that demonstrated high-grade right external iliac artery stenosis. I explained ot the patient that he is an excellent candidate for a right external iliac artery angioplasty and stent if his wishes to proceed. He  does not think his symptoms are disabling. He understands and would like to continue with non-operative management with daily ambulation.   Follow-up in 3 months for re-evaluation. He will follow up sooner if he develops a problem.

## 2023-08-30 NOTE — HISTORY OF PRESENT ILLNESS
[FreeTextEntry1] : 67-year-old male with past medical history of hypertension, hyperlipidemia, CAD status post coronary stents x3, severe spinal stenosis status post lumbar spine laminectomy and fusion presented for an evaluation of right lower extremity claudication.  Patient states that his symptoms started at least a few months ago.  Claudication with long distance more pronounced when ambulating up hill.  He ambulates 3 miles daily with his wife and flat surface with intermittent need to stop and rest.  Cramping in the calf goes away with rest.  He also has a separate left lower extremity neuralgia from history of spine surgery.  He denies rest pain.  Denies ulceration.  He is on aspirin and Plavix for coronary stents as well as on statin and blood pressure medications.\par  \par  Patient is a former smoker.\par  \par  He recently moved from the [place] where most of his physicians are including Dr. Cahrles Du ([id number]) and Dr. Cyrus Castillo ().  [de-identified] : 68-year-old male returns in follow-up for intermittent claudication on the right. Patient continues to ambulate daily without improvement of his symptoms. The patient can only walk about 0.5 miles before stopping. He reports that his symptoms worsen when he walks up-hill. The pain is alleviated with rest. He underwent a CTA and presents to discuss the results and additional treatment options.

## 2023-08-30 NOTE — PHYSICAL EXAM
[Normal Thyroid] : the thyroid was normal [1+] : right 1+ [0] : right 0 [2+] : left 2+ [Ankle Swelling Bilaterally] : bilaterally  [Alert] : alert [Oriented to Person] : oriented to person [Oriented to Place] : oriented to place [Oriented to Time] : oriented to time [Calm] : calm [JVD] : no jugular venous distention  [Ankle Swelling (On Exam)] : not present [Varicose Veins Of Lower Extremities] : not present [de-identified] : Awake and alert [de-identified] : obese [de-identified] : bilateral feet pink and warm [de-identified] : No gross motor or sensory deficits. [de-identified] : Appropriate affect.

## 2023-08-30 NOTE — DATA REVIEWED
[FreeTextEntry1] : CTA performed on 8/24/23 - personally reviewed -   Right lower extremity arteries:  There is a focal high-grade stenosis in the proximal external iliac artery.  The right common femoral artery is remarkable for bulky calcification causing a small stenosis.  The right superficial artery is remarkable for multifocal moderate to high grade stenoses with collateral vessels present.   Left lower extremity arteries:  The left external iliac artery is remarkable for a long segment, minimal stenosis.  The left common femoral artery is remarkable for bulky atherosclerotic calcification causing a mild stenosis. The left superficial femoral artery is remarkable for scattered multifocal moderate stenoses.

## 2023-09-01 NOTE — HEALTH RISK ASSESSMENT
[Yes] : Yes [No falls in past year] : Patient reported no falls in the past year [0] : 2) Feeling down, depressed, or hopeless: Not at all (0) [Former] : Former [10-14] : 10-14 [> 15 Years] : > 15 Years [[id number]] : 0

## 2023-09-01 NOTE — PLAN
[FreeTextEntry1] : All questions answered Reviewed recent notes from specialists Medications renewed

## 2023-09-28 ENCOUNTER — APPOINTMENT (OUTPATIENT)
Dept: HEPATOLOGY | Facility: CLINIC | Age: 68
End: 2023-09-28
Payer: MEDICARE

## 2023-09-28 VITALS
WEIGHT: 222 LBS | HEIGHT: 66 IN | DIASTOLIC BLOOD PRESSURE: 80 MMHG | BODY MASS INDEX: 35.68 KG/M2 | SYSTOLIC BLOOD PRESSURE: 138 MMHG

## 2023-09-28 DIAGNOSIS — R73.02 IMPAIRED GLUCOSE TOLERANCE (ORAL): ICD-10-CM

## 2023-09-28 DIAGNOSIS — F10.10 ALCOHOL ABUSE, UNCOMPLICATED: ICD-10-CM

## 2023-09-28 PROCEDURE — 99214 OFFICE O/P EST MOD 30 MIN: CPT

## 2023-11-15 ENCOUNTER — APPOINTMENT (OUTPATIENT)
Dept: HEPATOLOGY | Facility: CLINIC | Age: 68
End: 2023-11-15

## 2023-11-29 ENCOUNTER — RESULT REVIEW (OUTPATIENT)
Age: 68
End: 2023-11-29

## 2023-11-30 ENCOUNTER — APPOINTMENT (OUTPATIENT)
Dept: GASTROENTEROLOGY | Facility: HOSPITAL | Age: 68
End: 2023-11-30

## 2023-12-15 ENCOUNTER — RX RENEWAL (OUTPATIENT)
Age: 68
End: 2023-12-15

## 2023-12-22 ENCOUNTER — RX RENEWAL (OUTPATIENT)
Age: 68
End: 2023-12-22

## 2023-12-27 ENCOUNTER — APPOINTMENT (OUTPATIENT)
Dept: FAMILY MEDICINE | Facility: CLINIC | Age: 68
End: 2023-12-27
Payer: MEDICARE

## 2023-12-27 VITALS
DIASTOLIC BLOOD PRESSURE: 80 MMHG | HEIGHT: 66 IN | OXYGEN SATURATION: 93 % | HEART RATE: 72 BPM | BODY MASS INDEX: 36.16 KG/M2 | WEIGHT: 225 LBS | SYSTOLIC BLOOD PRESSURE: 120 MMHG

## 2023-12-27 PROCEDURE — 99214 OFFICE O/P EST MOD 30 MIN: CPT | Mod: 25

## 2023-12-27 PROCEDURE — 36415 COLL VENOUS BLD VENIPUNCTURE: CPT

## 2023-12-27 RX ORDER — SEMAGLUTIDE 0.5 MG/.5ML
0.5 INJECTION, SOLUTION SUBCUTANEOUS
Qty: 1 | Refills: 2 | Status: DISCONTINUED | COMMUNITY
Start: 2023-07-20 | End: 2023-12-27

## 2023-12-31 NOTE — HISTORY OF PRESENT ILLNESS
[FreeTextEntry1] : Follow up  [de-identified] : 68 year old male with pmh of HTN, HLD, CAD s/p stenting, PAD, Depression and anxiety, pulmonary nodules, spinal stenosis presenting for a follow up for his anemia. Reports he's always had low ferritin but has been taking iron recently. He had colonoscopy on 11/30/23 which showed multiple benign polyps. Denies any bleeding, headache, abdominal pain, dizziness or any other concerns.

## 2024-01-02 LAB
BASOPHILS # BLD AUTO: 0.07 K/UL
BASOPHILS NFR BLD AUTO: 1.1 %
EOSINOPHIL # BLD AUTO: 0.31 K/UL
EOSINOPHIL NFR BLD AUTO: 4.8 %
FERRITIN SERPL-MCNC: 27 NG/ML
HCT VFR BLD CALC: 44.8 %
HGB BLD-MCNC: 14.6 G/DL
IMM GRANULOCYTES NFR BLD AUTO: 0.2 %
IRON SATN MFR SERPL: 42 %
IRON SERPL-MCNC: 187 UG/DL
LYMPHOCYTES # BLD AUTO: 1.29 K/UL
LYMPHOCYTES NFR BLD AUTO: 20.1 %
MAN DIFF?: NORMAL
MCHC RBC-ENTMCNC: 28.7 PG
MCHC RBC-ENTMCNC: 32.6 GM/DL
MCV RBC AUTO: 88 FL
MONOCYTES # BLD AUTO: 0.63 K/UL
MONOCYTES NFR BLD AUTO: 9.8 %
NEUTROPHILS # BLD AUTO: 4.12 K/UL
NEUTROPHILS NFR BLD AUTO: 64 %
PLATELET # BLD AUTO: 312 K/UL
RBC # BLD: 5.09 M/UL
RBC # FLD: 16.5 %
TIBC SERPL-MCNC: 440 UG/DL
UIBC SERPL-MCNC: 254 UG/DL
WBC # FLD AUTO: 6.43 K/UL

## 2024-01-03 ENCOUNTER — APPOINTMENT (OUTPATIENT)
Dept: FAMILY MEDICINE | Facility: CLINIC | Age: 69
End: 2024-01-03

## 2024-01-10 ENCOUNTER — APPOINTMENT (OUTPATIENT)
Dept: VASCULAR SURGERY | Facility: CLINIC | Age: 69
End: 2024-01-10
Payer: MEDICARE

## 2024-01-10 VITALS
SYSTOLIC BLOOD PRESSURE: 131 MMHG | DIASTOLIC BLOOD PRESSURE: 82 MMHG | WEIGHT: 225 LBS | HEIGHT: 66 IN | BODY MASS INDEX: 36.16 KG/M2 | HEART RATE: 75 BPM

## 2024-01-10 PROCEDURE — 99213 OFFICE O/P EST LOW 20 MIN: CPT

## 2024-01-10 RX ORDER — GLUC/MSM/COLGN2/HYAL/ANTIARTH3 375-375-20
TABLET ORAL
Refills: 0 | Status: ACTIVE | COMMUNITY

## 2024-01-10 NOTE — ASSESSMENT
[FreeTextEntry1] : 68-year-old male with right lower extremity claudication. He continues to experience calf pain on the right, especially when walking uphill. He denies pain when ambulating downhill or on flat ground. The patient started iron and potassium supplements, which have improved his symptoms. He would like to continue with non-operative management with daily ambulation.  Follow-up in 6 months for re-evaluation. He will follow up sooner if he develops a problem.

## 2024-01-10 NOTE — END OF VISIT
[FreeTextEntry3] : All medical record entries made by the [facility] were at my, NIRAV SEBASTIAN, direction and personally dictated by me on 01/10/2024. I have reviewed the chart and agree that the record accurately reflects my personal performance of the history, physical exam, assessment and plan. I have also personally directed, reviewed, and agreed with the chart.

## 2024-01-10 NOTE — PHYSICAL EXAM
[Normal Thyroid] : the thyroid was normal [1+] : right 1+ [0] : right 0 [2+] : left 2+ [Ankle Swelling Bilaterally] : bilaterally  [Alert] : alert [Oriented to Person] : oriented to person [Oriented to Place] : oriented to place [Oriented to Time] : oriented to time [Calm] : calm [JVD] : no jugular venous distention  [Ankle Swelling (On Exam)] : not present [Varicose Veins Of Lower Extremities] : not present [de-identified] : Awake and alert [de-identified] : obese [de-identified] : bilateral feet pink and warm [de-identified] : No gross motor or sensory deficits. [de-identified] : Appropriate affect.

## 2024-01-10 NOTE — HISTORY OF PRESENT ILLNESS
[FreeTextEntry1] : 67-year-old male with past medical history of hypertension, hyperlipidemia, CAD status post coronary stents x3, severe spinal stenosis status post lumbar spine laminectomy and fusion presented for an evaluation of right lower extremity claudication.  Patient states that his symptoms started at least a few months ago.  Claudication with long distance more pronounced when ambulating up hill.  He ambulates 3 miles daily with his wife and flat surface with intermittent need to stop and rest.  Cramping in the calf goes away with rest.  He also has a separate left lower extremity neuralgia from history of spine surgery.  He denies rest pain.  Denies ulceration.  He is on aspirin and Plavix for coronary stents as well as on statin and blood pressure medications.\par  \par  Patient is a former smoker.\par  \par  He recently moved from the [place] where most of his physicians are including Dr. Charles Du ([phone]) and Dr. Cyrus Castillo ().  [de-identified] : 68-year-old male returns in follow-up for intermittent claudication on the right. Patient continues to ambulate daily. The patient has started taking iron and potassium supplements with improvement of his symptoms. He reports that he can walk longer distances before he develops pain.  He denies pain at rest or non-healing ulcerations.

## 2024-02-08 ENCOUNTER — APPOINTMENT (OUTPATIENT)
Dept: HEPATOLOGY | Facility: CLINIC | Age: 69
End: 2024-02-08

## 2024-04-04 ENCOUNTER — APPOINTMENT (OUTPATIENT)
Dept: HEPATOLOGY | Facility: CLINIC | Age: 69
End: 2024-04-04
Payer: MEDICARE

## 2024-04-04 VITALS
HEIGHT: 66 IN | SYSTOLIC BLOOD PRESSURE: 146 MMHG | BODY MASS INDEX: 35.36 KG/M2 | DIASTOLIC BLOOD PRESSURE: 84 MMHG | WEIGHT: 220 LBS

## 2024-04-04 DIAGNOSIS — R74.8 ABNORMAL LEVELS OF OTHER SERUM ENZYMES: ICD-10-CM

## 2024-04-04 PROCEDURE — 99214 OFFICE O/P EST MOD 30 MIN: CPT

## 2024-04-04 NOTE — HISTORY OF PRESENT ILLNESS
[FreeTextEntry1] : - 4/4/24: here with his wife. Bloodwork, shear-wave elastography repor now scanned. Insurance asked for prior authorization for Wegovy. Eats more healthily, smaller portions, but has not lost weight and still drinks 2 large 24 oz cans of beer twice a week.  States he is an alcoholic. He passed a kidney stone. Denies abdominal pain. Exam: 220 lbs, BMI 35.5  - 9/28/23: had bloodwork at last visit, and an US at [facility]. SW elastography done as well, but report not available. Wegovy not started as it is on back-order, apparently for 5 months. Eats cereal for breakfast intermittently, and dinner. Has reduced alcohol intake - drank once in the last month, a 6-pack last Sunday. Exam: 222 lbs, BMI 35.8. Has not lost weight.  - 7/20/23: Mr. QUICK is a 68 year old man with anxiety and depression, obesity, HTN, HLD, CAD/ s/p stents x 3 last 10 years ago, Plavix/asa, thoracic aortic aneurysm 4.4 cm, lung nodule, high risk for CHAD, PAD/RLE claudication on asa/plavix, back surgery, who drinks beer and saw Dr. Pierce several times at [facility] because of fatty liver with scarring. He tells me that he had a little cyst in the pancreas that is now gone. He and his wife have been unable to lose weight. Eats healthy and exercises, has extensive exercise equipment at home with they use frequently every week. Swims daily now in the summer. Lost abdominal girth, but gained 5 lbs, likely muscle Meals: cereal for breakfast, no lunch, dinner. LFTs were normal in March 2023,  G/L. HbA1c 6.1%, .   SHx: retired [employer]. .   Alcohol history: drinks off and on, reduced it in 2023 to 3 days per week, 6 beers on those days, for several weeks, then does not drink for a month. Drank same before, but 10 beers twice a month. Never had withdrawal.  Weight history: 225 lbs/BMI 36.3 in 7/2023. Max weight is this - stable for many years.  Remedies/OTC meds:  Workup: - 8/8/23 US abdomen (at [facility]): hepatic steatosis, no liver lesion - 8/8/23 shear-wave elastography: 7.2 kPa - stage F2-3 fibrosis - Chest CTs in our EMR - colonoscopy: around 2015, in the [place], told to repeat in 5 y; saw Dr. Landry

## 2024-04-04 NOTE — ASSESSMENT
[FreeTextEntry1] : Mr. QUICK is a 68-year-old man with anxiety and depression, obesity, HTN, HLD, CAD/ s/p stents x 3 last 10 years ago, thoracic aortic aneurysm 4.4 cm, lung nodule, high risk for CHAD, PAD/RLE claudication on asa/plavix, back surgery, who drinks beer and saw Dr. Pierce several times at [facility] because of fatty liver with scarring.  # MetALD (metabolic dysfunction-associated and alcohol-related liver diseas) - stage F2 fibrosis suggested by shear-wave elastography 8/2023 - elevated AST/ALT ~55 in 7/2023 - PLT > 200 G/L; hepatomegaly and mild R Dupuytren's contracture on exam - severe obesity, BMI 35 - binge drinking - in past 6 beers three times a week and also 10 beers twice a month, unable to stop - HLD - peripheral artery disease, sees vascular surgery - coffee use: 2-3 cups/day - colon cancer screening: Dr. Landry - nephrolithiasis 2024   Plan: - bloodwork today, return in 6-8 weeks - obesity: will ask our pharmacist to help with prior authorization for Wegovy. This may also help with craving for alcohol; advised again to stop drinking beer and to lose weight. Exercise is limited by peripheral artery disease. - will order resmetirom  - will consider acamprosate and referral for bariatric surgery - will plan US abdomen after a year, in  - he will provide records from [facility], including hepatology note, reports of ultrasound and fibroscan

## 2024-04-05 LAB
ALBUMIN SERPL ELPH-MCNC: 4.8 G/DL
ALP BLD-CCNC: 61 U/L
ALT SERPL-CCNC: 31 U/L
ANION GAP SERPL CALC-SCNC: 17 MMOL/L
AST SERPL-CCNC: 25 U/L
BASOPHILS # BLD AUTO: 0.09 K/UL
BASOPHILS NFR BLD AUTO: 1.2 %
BILIRUB SERPL-MCNC: 0.5 MG/DL
BUN SERPL-MCNC: 11 MG/DL
CALCIUM SERPL-MCNC: 10.1 MG/DL
CHLORIDE SERPL-SCNC: 101 MMOL/L
CO2 SERPL-SCNC: 22 MMOL/L
CREAT SERPL-MCNC: 0.71 MG/DL
EGFR: 101 ML/MIN/1.73M2
EOSINOPHIL # BLD AUTO: 0.2 K/UL
EOSINOPHIL NFR BLD AUTO: 2.8 %
GLUCOSE SERPL-MCNC: 92 MG/DL
HCT VFR BLD CALC: 48.4 %
HGB BLD-MCNC: 16.2 G/DL
IMM GRANULOCYTES NFR BLD AUTO: 0.4 %
LYMPHOCYTES # BLD AUTO: 1.35 K/UL
LYMPHOCYTES NFR BLD AUTO: 18.6 %
MAN DIFF?: NORMAL
MCHC RBC-ENTMCNC: 29.6 PG
MCHC RBC-ENTMCNC: 33.5 GM/DL
MCV RBC AUTO: 88.5 FL
MONOCYTES # BLD AUTO: 0.63 K/UL
MONOCYTES NFR BLD AUTO: 8.7 %
NEUTROPHILS # BLD AUTO: 4.96 K/UL
NEUTROPHILS NFR BLD AUTO: 68.3 %
PLATELET # BLD AUTO: 316 K/UL
POTASSIUM SERPL-SCNC: 4.4 MMOL/L
PROT SERPL-MCNC: 8 G/DL
RBC # BLD: 5.47 M/UL
RBC # FLD: 14.2 %
SODIUM SERPL-SCNC: 140 MMOL/L
WBC # FLD AUTO: 7.26 K/UL

## 2024-04-09 ENCOUNTER — APPOINTMENT (OUTPATIENT)
Dept: FAMILY MEDICINE | Facility: CLINIC | Age: 69
End: 2024-04-09
Payer: MEDICARE

## 2024-04-09 VITALS
HEART RATE: 70 BPM | BODY MASS INDEX: 34.87 KG/M2 | WEIGHT: 217 LBS | TEMPERATURE: 98 F | DIASTOLIC BLOOD PRESSURE: 90 MMHG | OXYGEN SATURATION: 97 % | SYSTOLIC BLOOD PRESSURE: 130 MMHG | HEIGHT: 66 IN

## 2024-04-09 DIAGNOSIS — I10 ESSENTIAL (PRIMARY) HYPERTENSION: ICD-10-CM

## 2024-04-09 DIAGNOSIS — E61.1 IRON DEFICIENCY: ICD-10-CM

## 2024-04-09 DIAGNOSIS — F41.8 OTHER SPECIFIED ANXIETY DISORDERS: ICD-10-CM

## 2024-04-09 DIAGNOSIS — K70.10 ALCOHOLIC HEPATITIS W/OUT ASCITES: ICD-10-CM

## 2024-04-09 DIAGNOSIS — Z12.5 ENCOUNTER FOR SCREENING FOR MALIGNANT NEOPLASM OF PROSTATE: ICD-10-CM

## 2024-04-09 DIAGNOSIS — E55.9 VITAMIN D DEFICIENCY, UNSPECIFIED: ICD-10-CM

## 2024-04-09 DIAGNOSIS — Z00.00 ENCOUNTER FOR GENERAL ADULT MEDICAL EXAMINATION W/OUT ABNORMAL FINDINGS: ICD-10-CM

## 2024-04-09 DIAGNOSIS — G47.33 OBSTRUCTIVE SLEEP APNEA (ADULT) (PEDIATRIC): ICD-10-CM

## 2024-04-09 DIAGNOSIS — R73.03 PREDIABETES.: ICD-10-CM

## 2024-04-09 DIAGNOSIS — I25.10 ATHEROSCLEROTIC HEART DISEASE OF NATIVE CORONARY ARTERY W/OUT ANGINA PECTORIS: ICD-10-CM

## 2024-04-09 LAB
25(OH)D3 SERPL-MCNC: 45.3 NG/ML
ALBUMIN SERPL ELPH-MCNC: 4.8 G/DL
ALP BLD-CCNC: 62 U/L
ALT SERPL-CCNC: 27 U/L
ANION GAP SERPL CALC-SCNC: 17 MMOL/L
AST SERPL-CCNC: 22 U/L
BASOPHILS # BLD AUTO: 0.07 K/UL
BASOPHILS NFR BLD AUTO: 1 %
BILIRUB SERPL-MCNC: 0.7 MG/DL
BUN SERPL-MCNC: 12 MG/DL
CALCIUM SERPL-MCNC: 9.7 MG/DL
CHLORIDE SERPL-SCNC: 100 MMOL/L
CHOLEST SERPL-MCNC: 124 MG/DL
CO2 SERPL-SCNC: 20 MMOL/L
CREAT SERPL-MCNC: 0.77 MG/DL
EGFR: 99 ML/MIN/1.73M2
EOSINOPHIL # BLD AUTO: 0.23 K/UL
EOSINOPHIL NFR BLD AUTO: 3.3 %
ESTIMATED AVERAGE GLUCOSE: 117 MG/DL
FERRITIN SERPL-MCNC: 61 NG/ML
FOLATE SERPL-MCNC: >20 NG/ML
GLUCOSE SERPL-MCNC: 96 MG/DL
HBA1C MFR BLD HPLC: 5.7 %
HCT VFR BLD CALC: 46.3 %
HDLC SERPL-MCNC: 43 MG/DL
HGB BLD-MCNC: 15.8 G/DL
IMM GRANULOCYTES NFR BLD AUTO: 0.3 %
IRON SATN MFR SERPL: 25 %
IRON SERPL-MCNC: 102 UG/DL
LDLC SERPL CALC-MCNC: 59 MG/DL
LYMPHOCYTES # BLD AUTO: 1.77 K/UL
LYMPHOCYTES NFR BLD AUTO: 25.4 %
MAN DIFF?: NORMAL
MCHC RBC-ENTMCNC: 29.4 PG
MCHC RBC-ENTMCNC: 34.1 GM/DL
MCV RBC AUTO: 86.1 FL
MONOCYTES # BLD AUTO: 0.58 K/UL
MONOCYTES NFR BLD AUTO: 8.3 %
NEUTROPHILS # BLD AUTO: 4.29 K/UL
NEUTROPHILS NFR BLD AUTO: 61.7 %
NONHDLC SERPL-MCNC: 81 MG/DL
PLATELET # BLD AUTO: 283 K/UL
POTASSIUM SERPL-SCNC: 4 MMOL/L
PROT SERPL-MCNC: 7.7 G/DL
PSA FREE FLD-MCNC: 22 %
PSA FREE SERPL-MCNC: 0.45 NG/ML
PSA SERPL-MCNC: 2 NG/ML
RBC # BLD: 5.38 M/UL
RBC # FLD: 14.6 %
SODIUM SERPL-SCNC: 137 MMOL/L
TIBC SERPL-MCNC: 407 UG/DL
TRIGL SERPL-MCNC: 124 MG/DL
TSH SERPL-ACNC: 4.03 UIU/ML
UIBC SERPL-MCNC: 305 UG/DL
VIT B12 SERPL-MCNC: 922 PG/ML
WBC # FLD AUTO: 6.96 K/UL

## 2024-04-09 PROCEDURE — 99213 OFFICE O/P EST LOW 20 MIN: CPT | Mod: 25

## 2024-04-09 PROCEDURE — G0439: CPT

## 2024-04-09 PROCEDURE — 36415 COLL VENOUS BLD VENIPUNCTURE: CPT

## 2024-04-09 RX ORDER — FLUOXETINE HYDROCHLORIDE 20 MG/1
20 TABLET ORAL DAILY
Qty: 180 | Refills: 1 | Status: ACTIVE | COMMUNITY
Start: 2023-05-22 | End: 1900-01-01

## 2024-04-10 LAB
APPEARANCE: CLEAR
BILIRUBIN URINE: NEGATIVE
BLOOD URINE: NEGATIVE
COLOR: YELLOW
GLUCOSE QUALITATIVE U: NEGATIVE MG/DL
KETONES URINE: NEGATIVE MG/DL
LEUKOCYTE ESTERASE URINE: NEGATIVE
NITRITE URINE: NEGATIVE
PH URINE: 6.5
PROTEIN URINE: NEGATIVE MG/DL
SPECIFIC GRAVITY URINE: 1.01
UROBILINOGEN URINE: 0.2 MG/DL

## 2024-04-11 RX ORDER — RESMETIROM 80 MG/1
80 TABLET, COATED ORAL
Qty: 30 | Refills: 2 | Status: DISCONTINUED | COMMUNITY
Start: 2024-04-10 | End: 2024-04-11

## 2024-04-16 ENCOUNTER — NON-APPOINTMENT (OUTPATIENT)
Age: 69
End: 2024-04-16

## 2024-04-18 PROBLEM — Z00.00 ENCOUNTER FOR PREVENTIVE HEALTH EXAMINATION: Status: ACTIVE | Noted: 2021-09-27

## 2024-04-18 PROBLEM — E61.1 IRON DEFICIENCY: Status: ACTIVE | Noted: 2023-09-28

## 2024-04-18 PROBLEM — F41.8 DEPRESSION WITH ANXIETY: Status: ACTIVE | Noted: 2021-09-29

## 2024-04-18 PROBLEM — I10 HYPERTENSION: Status: ACTIVE | Noted: 2021-09-29

## 2024-04-18 PROBLEM — R73.03 PREDIABETES: Status: ACTIVE | Noted: 2024-04-09

## 2024-04-18 PROBLEM — K70.10 ALCOHOLIC STEATOHEPATITIS: Status: ACTIVE | Noted: 2023-07-20

## 2024-04-18 PROBLEM — G47.33 OSA (OBSTRUCTIVE SLEEP APNEA): Status: ACTIVE | Noted: 2023-05-26

## 2024-04-18 PROBLEM — Z12.5 PROSTATE CANCER SCREENING: Status: ACTIVE | Noted: 2023-03-28

## 2024-04-18 PROBLEM — I25.10 CAD (CORONARY ATHEROSCLEROTIC DISEASE): Status: ACTIVE | Noted: 2023-06-08

## 2024-04-18 NOTE — HISTORY OF PRESENT ILLNESS
[FreeTextEntry1] : Annual [de-identified] : 67 yo M with HTN, HLD, CAD s/p stents x3, spinal stenosis s/p lumbar spine laminectomy and fusion, anxiety and depression, PAD, fatty liver, alcohol use, obesity, and stable aortic aneurysm, presenting for annual physical. Reports no acute concerns or complaints at this time except would like to discuss medication for anxiety and depression as has noticed more irritability recently. Denies any headaches, chest pain, shortness of breath, nausea/vomiting, diarrhea, constipation, changes in vision. UTD with colonoscopy, 11/2023, repeat 2-3 years.

## 2024-04-18 NOTE — PLAN
[FreeTextEntry1] : 69 yo M with HTN, HLD, CAD s/p stents x3, spinal stenosis s/p lumbar spine laminectomy and fusion, anxiety and depression, PAD, fatty liver, alcohol use, obesity, and stable aortic aneurysm, presenting for annual physical. HTN - BP at goal, continue medications CAD - follows with Dr. Castillo, cardiology PAD - follows with Dr. Le, vascular, on aspirin Hx of kidney stones/BPH - follows with Dr. Jo, stable Fatty liver - follows with Dr. Badillo, q6 months ultrasounds, decrease weight, avoid alcohol, prescribed wegovy Anxiety and depression - discussed additional options for treatment, decided to increase fluoextine to 40mg daily and monitor response, improvement should be noted in 4-6 weeks, patient with understanding, also on alprazolam PRN All questions answered Colonoscopy UTD Shingles vaccine UTD, Pneumonia vaccine UTD Labs drawn in office

## 2024-04-18 NOTE — HEALTH RISK ASSESSMENT
[Good] : ~his/her~  mood as  good [Yes] : Yes [2 - 4 times a month (2 pts)] : 2-4 times a month (2 points) [1 or 2 (0 pts)] : 1 or 2 (0 points) [Never (0 pts)] : Never (0 points) [No] : In the past 12 months have you used drugs other than those required for medical reasons? No [No falls in past year] : Patient reported no falls in the past year [0] : 1) Little interest or pleasure doing things: Not at all (0) [1] : 2) Feeling down, depressed, or hopeless for several days (1) [PHQ-2 Negative - No further assessment needed] : PHQ-2 Negative - No further assessment needed [Patient reported colonoscopy was normal] : Patient reported colonoscopy was normal [Fully functional (bathing, dressing, toileting, transferring, walking, feeding)] : Fully functional (bathing, dressing, toileting, transferring, walking, feeding) [Fully functional (using the telephone, shopping, preparing meals, housekeeping, doing laundry, using] : Fully functional and needs no help or supervision to perform IADLs (using the telephone, shopping, preparing meals, housekeeping, doing laundry, using transportation, managing medications and managing finances) [BoneDensityComments] : Has not had one [Former] : Former [FreeTextEntry1] : Starting Co Q 10 [Audit-CScore] : 2 [de-identified] : Walking, lifting weights [de-identified] : None [[id number]] : 1 [Patient reported colonoscopy was abnormal] : Patient reported colonoscopy was abnormal [With Family] : lives with family [] :  [Feels Safe at Home] : Feels safe at home [Reports changes in hearing] : Reports no changes in hearing [Reports changes in vision] : Reports no changes in vision [ColonoscopyDate] : 11/23 [ColonoscopyComments] : polyps; 3 year repeat [20 or more] : 20 or more [< 15 Years] : < 15 Years

## 2024-06-13 ENCOUNTER — APPOINTMENT (OUTPATIENT)
Dept: HEPATOLOGY | Facility: CLINIC | Age: 69
End: 2024-06-13
Payer: MEDICARE

## 2024-06-13 VITALS
BODY MASS INDEX: 34.39 KG/M2 | SYSTOLIC BLOOD PRESSURE: 134 MMHG | HEART RATE: 70 BPM | HEIGHT: 66 IN | WEIGHT: 214 LBS | DIASTOLIC BLOOD PRESSURE: 90 MMHG | OXYGEN SATURATION: 98 %

## 2024-06-13 DIAGNOSIS — E78.5 HYPERLIPIDEMIA, UNSPECIFIED: ICD-10-CM

## 2024-06-13 DIAGNOSIS — K74.00 HEPATIC FIBROSIS, UNSPECIFIED: ICD-10-CM

## 2024-06-13 DIAGNOSIS — K75.81 NONALCOHOLIC STEATOHEPATITIS (NASH): ICD-10-CM

## 2024-06-13 DIAGNOSIS — E66.01 MORBID (SEVERE) OBESITY DUE TO EXCESS CALORIES: ICD-10-CM

## 2024-06-13 PROCEDURE — G2211 COMPLEX E/M VISIT ADD ON: CPT

## 2024-06-13 PROCEDURE — 99214 OFFICE O/P EST MOD 30 MIN: CPT

## 2024-06-13 RX ORDER — RESMETIROM 60 MG/1
60 TABLET, COATED ORAL
Qty: 30 | Refills: 5 | Status: ACTIVE | COMMUNITY
Start: 2024-04-11 | End: 1900-01-01

## 2024-06-13 RX ORDER — SEMAGLUTIDE 2.4 MG/.75ML
2.4 INJECTION, SOLUTION SUBCUTANEOUS
Qty: 3 | Refills: 8 | Status: ACTIVE | COMMUNITY
Start: 2024-04-04 | End: 1900-01-01

## 2024-06-13 NOTE — ASSESSMENT
[FreeTextEntry1] : Mr. QUICK is a 69-year-old man with anxiety and depression, obesity, HTN, HLD, CAD/ s/p stents x 3 last  ~2013, thoracic aortic aneurysm 4.4 cm, lung nodule, PAD, RLE claudication on ASA/clopidogrel, back surgery, who saw Dr. Pierce several times at [facility] because of fatty liver.  # MetALD (metabolic dysfunction-associated and alcohol-related liver disease) - liver fibrosis, stage F2 suggested by shear-wave elastography 8/2023   -  - elevated AST/ALT ~55 in 7/2023, normal in 4/2024 - PLT > 200 G/L; hepatomegaly and mild R Dupuytren's contracture on exam - severe obesity, BMI 35   - on Wegovy since 4/2024 - lost 7 lbs only as he thought he needs to keep eating - binge drinking - in past 6 beers three times a week and also 10 beers twice a month - reduced to one 24-oz can twice a week since on Wegovy - HLD - peripheral artery disease, sees vascular surgery - coffee use: 2-3 cups/day - colon cancer screening: Dr. Hernandez-Bhargavi - nephrolithiasis 2024  Plan: - encouraged to eat much less, consider introducing 2 days per week with no food or minimal food needed for medications that should be taken with food - bloodwork today - bloodwork, US and SW elastography before next visit in August/September - increase Wegovy to 2.0 mg/week - he will finish his 1.7 mg supply of 2 more weeks - continue resmetirom 60 mg/d

## 2024-06-13 NOTE — HISTORY OF PRESENT ILLNESS
[FreeTextEntry1] : - 6/13/24: returns after bloodwork. Started Wegovy, now on 1.7 mg/week, and resmetirom (60 mg/ since weight <100 kg and on statin). Lost 3 lbs only, feels he has less appetite, but eats breakfast and dinner as he heard he should keep eating. Lifts weights. Reduced alcohol use to a 24-oz can twice a week (from 3 cans ever other day or so) - s.t. does not finish his can as Wegovy curbs his appetite for it. Exam: 217 lbs (98.6 kg), BMI 35. Labs 4/9/24 all normal: , BMP, LFTs, iron panel  - 4/4/24: here with his wife. Bloodwork, shear-wave elastography report now scanned. Insurance asked for prior authorization for Wegovy. Eats more healthily, smaller portions, but has not lost weight and still drinks 2 large 24 oz cans of beer twice a week.  States he is an alcoholic. He passed a kidney stone. Denies abdominal pain. Exam: 220 lbs, BMI 35.5  - 9/28/23: had bloodwork at last visit, and an US at [facility]. SW elastography done as well, but report not available. Wegovy not started as it is on back-order, apparently for 5 months. Eats cereal for breakfast intermittently, and dinner. Has reduced alcohol intake - drank once in the last month, a 6-pack last Sunday. Exam: 222 lbs, BMI 35.8. Has not lost weight.  - 7/20/23: Mr. QUICK is a 68 year old man with anxiety and depression, obesity, HTN, HLD, CAD/ s/p stents x 3 last 10 years ago, Plavix/asa, thoracic aortic aneurysm 4.4 cm, lung nodule, high risk for CHAD, PAD/RLE claudication on asa/plavix, back surgery, who drinks beer and saw Dr. Pierce several times at [facility] because of fatty liver with scarring. He tells me that he had a little cyst in the pancreas that is now gone. He and his wife have been unable to lose weight. Eats healthy and exercises, has extensive exercise equipment at home with they use frequently every week. Swims daily now in the summer. Lost abdominal girth, but gained 5 lbs, likely muscle Meals: cereal for breakfast, no lunch, dinner. LFTs were normal in March 2023,  G/L. HbA1c 6.1%, .   SHx: retired [employer]. .   Alcohol history: drinks off and on, reduced it in 2023 to 3 days per week, 6 beers on those days, for several weeks, then does not drink for a month. Drank same before, but 10 beers twice a month. Never had withdrawal.  Weight history: 225 lbs/BMI 36.3 in 7/2023. Max weight is this - stable for many years.  Remedies/OTC meds:  Workup: - 8/8/23 US abdomen (at [facility]): hepatic steatosis, no liver lesion - 8/8/23 shear-wave elastography: 7.2 kPa - stage F2-3 fibrosis - Chest CTs in our EMR - colonoscopy: around 2015, in the [place], told to repeat in 5 y; saw Dr. Landry

## 2024-06-14 LAB
ALBUMIN SERPL ELPH-MCNC: 4.8 G/DL
ALP BLD-CCNC: 68 U/L
ALT SERPL-CCNC: 51 U/L
ANION GAP SERPL CALC-SCNC: 16 MMOL/L
AST SERPL-CCNC: 35 U/L
BASOPHILS # BLD AUTO: 0.06 K/UL
BASOPHILS NFR BLD AUTO: 1.2 %
BILIRUB SERPL-MCNC: 0.6 MG/DL
BUN SERPL-MCNC: 13 MG/DL
CALCIUM SERPL-MCNC: 9.9 MG/DL
CHLORIDE SERPL-SCNC: 103 MMOL/L
CO2 SERPL-SCNC: 21 MMOL/L
CREAT SERPL-MCNC: 0.85 MG/DL
EGFR: 95 ML/MIN/1.73M2
EOSINOPHIL # BLD AUTO: 0.15 K/UL
EOSINOPHIL NFR BLD AUTO: 2.9 %
GLUCOSE SERPL-MCNC: 131 MG/DL
HCT VFR BLD CALC: 44.9 %
HGB BLD-MCNC: 14.9 G/DL
IMM GRANULOCYTES NFR BLD AUTO: 0.4 %
LYMPHOCYTES # BLD AUTO: 1.15 K/UL
LYMPHOCYTES NFR BLD AUTO: 22.3 %
MAN DIFF?: NORMAL
MCHC RBC-ENTMCNC: 29.8 PG
MCHC RBC-ENTMCNC: 33.2 GM/DL
MCV RBC AUTO: 89.8 FL
MONOCYTES # BLD AUTO: 0.32 K/UL
MONOCYTES NFR BLD AUTO: 6.2 %
NEUTROPHILS # BLD AUTO: 3.45 K/UL
NEUTROPHILS NFR BLD AUTO: 67 %
PLATELET # BLD AUTO: 221 K/UL
POTASSIUM SERPL-SCNC: 4.1 MMOL/L
PROT SERPL-MCNC: 7.7 G/DL
RBC # BLD: 5 M/UL
RBC # FLD: 13.5 %
SODIUM SERPL-SCNC: 140 MMOL/L
WBC # FLD AUTO: 5.15 K/UL

## 2024-06-26 ENCOUNTER — APPOINTMENT (OUTPATIENT)
Dept: VASCULAR SURGERY | Facility: CLINIC | Age: 69
End: 2024-06-26
Payer: MEDICARE

## 2024-06-26 VITALS
BODY MASS INDEX: 33.75 KG/M2 | SYSTOLIC BLOOD PRESSURE: 123 MMHG | WEIGHT: 210 LBS | DIASTOLIC BLOOD PRESSURE: 83 MMHG | HEIGHT: 66 IN | HEART RATE: 72 BPM

## 2024-06-26 DIAGNOSIS — I73.9 PERIPHERAL VASCULAR DISEASE, UNSPECIFIED: ICD-10-CM

## 2024-06-26 PROCEDURE — 99213 OFFICE O/P EST LOW 20 MIN: CPT

## 2024-06-27 ENCOUNTER — RX RENEWAL (OUTPATIENT)
Age: 69
End: 2024-06-27

## 2024-07-15 ENCOUNTER — APPOINTMENT (OUTPATIENT)
Dept: FAMILY MEDICINE | Facility: CLINIC | Age: 69
End: 2024-07-15
Payer: MEDICARE

## 2024-07-15 VITALS
SYSTOLIC BLOOD PRESSURE: 126 MMHG | HEIGHT: 66 IN | DIASTOLIC BLOOD PRESSURE: 88 MMHG | WEIGHT: 210 LBS | HEART RATE: 74 BPM | OXYGEN SATURATION: 96 % | BODY MASS INDEX: 33.75 KG/M2

## 2024-07-15 DIAGNOSIS — I10 ESSENTIAL (PRIMARY) HYPERTENSION: ICD-10-CM

## 2024-07-15 DIAGNOSIS — R73.03 PREDIABETES.: ICD-10-CM

## 2024-07-15 PROCEDURE — G2211 COMPLEX E/M VISIT ADD ON: CPT

## 2024-07-15 PROCEDURE — 36415 COLL VENOUS BLD VENIPUNCTURE: CPT

## 2024-07-15 PROCEDURE — 99214 OFFICE O/P EST MOD 30 MIN: CPT

## 2024-08-05 ENCOUNTER — LABORATORY RESULT (OUTPATIENT)
Age: 69
End: 2024-08-05

## 2024-08-05 ENCOUNTER — RESULT REVIEW (OUTPATIENT)
Age: 69
End: 2024-08-05

## 2024-08-08 ENCOUNTER — APPOINTMENT (OUTPATIENT)
Dept: HEPATOLOGY | Facility: CLINIC | Age: 69
End: 2024-08-08

## 2024-08-08 PROBLEM — F10.10 ALCOHOL CONSUMPTION BINGE DRINKING: Status: RESOLVED | Noted: 2023-07-20 | Resolved: 2024-08-08

## 2024-08-08 PROCEDURE — 99214 OFFICE O/P EST MOD 30 MIN: CPT

## 2024-08-08 PROCEDURE — G2211 COMPLEX E/M VISIT ADD ON: CPT

## 2024-08-08 NOTE — HISTORY OF PRESENT ILLNESS
[FreeTextEntry1] : - 8/8/24: had BW and US, SW elastography is scheduled in September; he increased Wegovy to 2.4 mg/week, lost another 14 lbs - now fasts one day per week on, eats small food with pills in the morning, nothing after that. Feels good. Wife losing weight on Ozempic as well. He has loose stools intermittently, depending on food, since Wegovy started, once a week. He stopped drinking any alcohol, feels Wegovy causes aversion to alcohol - could not drink alcohol he was offered at a wedding. Exam: 200 lbs, BMI 32.3 Labs 8/05/24: normal: , BMP, LFTs with ALT 40 (normalized)  - 6/13/24: returns after bloodwork. Started Wegovy, now on 1.7 mg/week, and resmetirom (60 mg/ since weight <100 kg and on statin). Lost 3 lbs only, feels he has less appetite, but eats breakfast and dinner as he heard he should keep eating. Lifts weights. Reduced alcohol use to a 24-oz can twice a week (from 3 cans ever other day or so) - s.t. does not finish his can as Wegovy curbs his appetite for it. Exam: 214 lbs (98.6 kg), BMI 35. Labs 4/9/24 all normal: , BMP, LFTs, iron panel  - 4/4/24: here with his wife. Bloodwork, shear-wave elastography report now scanned. Insurance asked for prior authorization for Wegovy. Eats more healthily, smaller portions, but has not lost weight and still drinks 2 large 24 oz cans of beer twice a week.  States he is an alcoholic. He passed a kidney stone. Denies abdominal pain. Exam: 220 lbs, BMI 35.5  - 9/28/23: had bloodwork at last visit, and an US at [facility]. SW elastography done as well, but report not available. Wegovy not started as it is on back-order, apparently for 5 months. Eats cereal for breakfast intermittently, and dinner. Has reduced alcohol intake - drank once in the last month, a 6-pack last Sunday. Exam: 222 lbs, BMI 35.8. Has not lost weight.  - 7/20/23: Mr. QUICK is a 68 year old man with anxiety and depression, obesity, HTN, HLD, CAD/ s/p stents x 3 last 10 years ago, Plavix/asa, thoracic aortic aneurysm 4.4 cm, lung nodule, high risk for CHAD, PAD/RLE claudication on asa/plavix, back surgery, who drinks beer and saw Dr. Pierce several times at [facility] because of fatty liver with scarring. He tells me that he had a little cyst in the pancreas that is now gone. He and his wife have been unable to lose weight. Eats healthy and exercises, has extensive exercise equipment at home with they use frequently every week. Swims daily now in the summer. Lost abdominal girth, but gained 5 lbs, likely muscle Meals: cereal for breakfast, no lunch, dinner. LFTs were normal in March 2023,  G/L. HbA1c 6.1%, .   SHx: retired [employer]. .   Alcohol history: drinks off and on, reduced it in 2023 to 3 days per week, 6 beers on those days, for several weeks, then does not drink for a month. Drank same before, but 10 beers twice a month. Never had withdrawal.  Weight history: 225 lbs/BMI 36.3 in 7/2023. Max weight is this - stable for many years.  Remedies/OTC meds:  Workup: - 8/05/24 US abdomen: slightly increased echogenicity - mild steatosis. No focal mass. Gallbladder and spleen normal. - 8/8/23 US abdomen (at [facility]): hepatic steatosis, no liver lesion - 8/8/23 shear-wave elastography: 7.2 kPa - stage F2-3 fibrosis - Chest CTs in our EMR - colonoscopy: around 2015, in the [place], told to repeat in 5 y; saw Dr. Landry

## 2024-08-08 NOTE — ASSESSMENT
[FreeTextEntry1] : Mr. QUICK is a 69-year-old man with anxiety and depression, obesity, HTN, HLD, CAD/ s/p stents x 3 last  ~2013, thoracic aortic aneurysm 4.4 cm, lung nodule, PAD, RLE claudication on ASA/clopidogrel, back surgery, who saw Dr. Pierce several times at [facility] because of fatty liver.  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 Has diarrhea once a week since   - stopped snoring  - h/o binge drinking - in past 6 beers three times a week and also 10 beers twice a month - reduced to one 24-oz can twice a week, stopped since on Wegovy, now cannot stand smell of alcoholic drinks - HLD - peripheral artery disease, sees vascular surgery - coffee use: 2-3 cups/day - colon cancer screening: Dr. Hernandez-Bhargavi - nephrolithiasis 2024  Plan: - continue Wegovy 2.4 mg/week - continue resmetirom 60 mg/d - SW elastography - has been scheduled - return in 2 months after bloodwork

## 2024-08-19 ENCOUNTER — RX RENEWAL (OUTPATIENT)
Age: 69
End: 2024-08-19

## 2024-10-03 ENCOUNTER — APPOINTMENT (OUTPATIENT)
Dept: HEPATOLOGY | Facility: CLINIC | Age: 69
End: 2024-10-03
Payer: MEDICARE

## 2024-10-03 VITALS
WEIGHT: 195 LBS | OXYGEN SATURATION: 98 % | HEIGHT: 66 IN | BODY MASS INDEX: 31.34 KG/M2 | SYSTOLIC BLOOD PRESSURE: 126 MMHG | DIASTOLIC BLOOD PRESSURE: 62 MMHG | HEART RATE: 76 BPM

## 2024-10-03 DIAGNOSIS — R74.8 ABNORMAL LEVELS OF OTHER SERUM ENZYMES: ICD-10-CM

## 2024-10-03 DIAGNOSIS — E78.5 HYPERLIPIDEMIA, UNSPECIFIED: ICD-10-CM

## 2024-10-03 DIAGNOSIS — K75.81 NONALCOHOLIC STEATOHEPATITIS (NASH): ICD-10-CM

## 2024-10-03 DIAGNOSIS — F10.10 ALCOHOL ABUSE, UNCOMPLICATED: ICD-10-CM

## 2024-10-03 DIAGNOSIS — K74.00 HEPATIC FIBROSIS, UNSPECIFIED: ICD-10-CM

## 2024-10-03 DIAGNOSIS — R73.03 PREDIABETES.: ICD-10-CM

## 2024-10-03 DIAGNOSIS — E66.01 MORBID (SEVERE) OBESITY DUE TO EXCESS CALORIES: ICD-10-CM

## 2024-10-03 PROCEDURE — G2211 COMPLEX E/M VISIT ADD ON: CPT

## 2024-10-03 PROCEDURE — 99214 OFFICE O/P EST MOD 30 MIN: CPT

## 2024-10-03 NOTE — ASSESSMENT
[FreeTextEntry1] : Mr. QUICK is a 69-year-old man with anxiety and depression, obesity, HTN, HLD, CAD/ s/p stents x 3 last  ~2013, thoracic aortic aneurysm 4.4 cm, lung nodule, PAD, RLE claudication on ASA/clopidogrel, back surgery, who saw Dr. Pierce several times at [facility] because of fatty liver.  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 fat.   - on Wegovy since 4/2024 - lost 14 lbs since then, had lost 11 lbs before that. Has diarrhea once a week since   - stopped snoring  - h/o binge drinking - in past 6 beers three times a week and also 10 beers twice a month - reduced to one 24-oz can twice a week, stopped since on Wegovy, now cannot stand smell of alcoholic drinks - HLD - peripheral artery disease, sees vascular surgery - coffee use: 2-3 cups/day - colon cancer screening: Dr. Hernandez-Bhargavi - nephrolithiasis 2024  Plan: - continue Wegovy 2.4 mg/week - continue resmetirom 60 mg/d - absolute alcohol abstinence - SW elastography - has been scheduled -  return in 6 months after bloodwork

## 2024-10-03 NOTE — HISTORY OF PRESENT ILLNESS
[FreeTextEntry1] : - 10/3/24: is 5 lbs lighter. Started resmetirom, continues Wegovy 2.4 mg/week, had BW, US, and shear-wave elastography - again fatty liver and stage F2-3 fibrosis. Feels good. Does a lot of weight training. Got drunk 2 weeks ago, drinks every 2-4 weeks now. Exam: 195 lbs, BMI 31.5. Periumbilical skin fold thickness only ~4.5 cm. Muscular. Labs 9/23/24: abnormal: glc 134. Normal: LFTs with ALT 29, , creatinine 0.88.   - 8/8/24: had BW and US, SW elastography is scheduled in September; he increased Wegovy to 2.4 mg/week, lost another 14 lbs - now fasts one day per week on, eats small food with pills in the morning, nothing after that. Feels good. Wife losing weight on Ozempic as well. He has loose stools intermittently, depending on food, since Wegovy started, once a week. He stopped drinking any alcohol, feels Wegovy causes aversion to alcohol - could not drink alcohol he was offered at a wedding. Exam: 200 lbs, BMI 32.3 Labs 8/05/24: normal: , BMP, LFTs with ALT 40 (normalized)  - 6/13/24: returns after bloodwork. Started Wegovy, now on 1.7 mg/week, and resmetirom (60 mg/ since weight <100 kg and on statin). Lost 3 lbs only, feels he has less appetite, but eats breakfast and dinner as he heard he should keep eating. Lifts weights. Reduced alcohol use to a 24-oz can twice a week (from 3 cans ever other day or so) - s.t. does not finish his can as Wegovy curbs his appetite for it. Exam: 214 lbs (98.6 kg), BMI 35. Labs 4/9/24 all normal: , BMP, LFTs, iron panel  - 4/4/24: here with his wife. Bloodwork, shear-wave elastography report now scanned. Insurance asked for prior authorization for Wegovy. Eats more healthily, smaller portions, but has not lost weight and still drinks 2 large 24 oz cans of beer twice a week.  States he is an alcoholic. He passed a kidney stone. Denies abdominal pain. Exam: 220 lbs, BMI 35.5  - 9/28/23: had bloodwork at last visit, and an US at [facility]. SW elastography done as well, but report not available. Wegovy not started as it is on back-order, apparently for 5 months. Eats cereal for breakfast intermittently, and dinner. Has reduced alcohol intake - drank once in the last month, a 6-pack last Sunday. Exam: 222 lbs, BMI 35.8. Has not lost weight.  - 7/20/23: Mr. QUICK is a 68 year old man with anxiety and depression, obesity, HTN, HLD, CAD/ s/p stents x 3 last 10 years ago, Plavix/asa, thoracic aortic aneurysm 4.4 cm, lung nodule, high risk for CHAD, PAD/RLE claudication on asa/plavix, back surgery, who drinks beer and saw Dr. Pierce several times at [facility] because of fatty liver with scarring. He tells me that he had a little cyst in the pancreas that is now gone. He and his wife have been unable to lose weight. Eats healthy and exercises, has extensive exercise equipment at home with they use frequently every week. Swims daily now in the summer. Lost abdominal girth, but gained 5 lbs, likely muscle Meals: cereal for breakfast, no lunch, dinner. LFTs were normal in March 2023,  G/L. HbA1c 6.1%, .   SHx: retired [employer]. .   Alcohol history: drinks off and on, reduced it in 2023 to 3 days per week, 6 beers on those days, for several weeks, then does not drink for a month. Drank same before, but 10 beers twice a month. Never had withdrawal.  Weight history: 225 lbs/BMI 36.3 in 7/2023. Max weight is this - stable for many years.  Remedies/OTC meds:  Workup: - 9/26/24 SW elastography: 10.75 kPa - F2-3. - 9/24/24 US abdomen ([facility]): Steatosis, less pronounced. Hepatic cysts, L renal cyst. Prominent aorta with distal taper, mild atherosclerosis. Spleen normal. - 8/05/24 US abdomen: slightly increased echogenicity - mild steatosis. No focal mass. Gallbladder and spleen normal. - 8/8/23 US abdomen (at [facility]): hepatic steatosis, no liver lesion - 8/8/23 shear-wave elastography: 7.2 kPa - stage F2-3 fibrosis - Chest CTs in our EMR - colonoscopy: around 2015, in the [place], told to repeat in 5 y; saw Dr. Landry

## 2024-10-15 ENCOUNTER — APPOINTMENT (OUTPATIENT)
Dept: FAMILY MEDICINE | Facility: CLINIC | Age: 69
End: 2024-10-15
Payer: MEDICARE

## 2024-10-15 VITALS
HEART RATE: 70 BPM | BODY MASS INDEX: 31.66 KG/M2 | DIASTOLIC BLOOD PRESSURE: 80 MMHG | WEIGHT: 197 LBS | SYSTOLIC BLOOD PRESSURE: 120 MMHG | OXYGEN SATURATION: 96 % | HEIGHT: 66 IN

## 2024-10-15 DIAGNOSIS — Z23 ENCOUNTER FOR IMMUNIZATION: ICD-10-CM

## 2024-10-15 DIAGNOSIS — J30.2 OTHER SEASONAL ALLERGIC RHINITIS: ICD-10-CM

## 2024-10-15 DIAGNOSIS — E78.5 HYPERLIPIDEMIA, UNSPECIFIED: ICD-10-CM

## 2024-10-15 DIAGNOSIS — R73.03 PREDIABETES.: ICD-10-CM

## 2024-10-15 DIAGNOSIS — E66.01 MORBID (SEVERE) OBESITY DUE TO EXCESS CALORIES: ICD-10-CM

## 2024-10-15 DIAGNOSIS — I10 ESSENTIAL (PRIMARY) HYPERTENSION: ICD-10-CM

## 2024-10-15 PROCEDURE — 99214 OFFICE O/P EST MOD 30 MIN: CPT

## 2024-10-15 PROCEDURE — G0008: CPT

## 2024-10-15 PROCEDURE — G2211 COMPLEX E/M VISIT ADD ON: CPT

## 2024-10-15 PROCEDURE — 90662 IIV NO PRSV INCREASED AG IM: CPT

## 2024-10-15 PROCEDURE — 36415 COLL VENOUS BLD VENIPUNCTURE: CPT

## 2024-10-15 RX ORDER — ALBUTEROL SULFATE 90 UG/1
108 (90 BASE) INHALANT RESPIRATORY (INHALATION)
Qty: 1 | Refills: 2 | Status: ACTIVE | COMMUNITY
Start: 2024-10-15 | End: 1900-01-01

## 2024-10-15 RX ORDER — FLUTICASONE PROPIONATE 50 UG/1
50 SPRAY, METERED NASAL DAILY
Qty: 1 | Refills: 3 | Status: ACTIVE | COMMUNITY
Start: 2024-10-15 | End: 1900-01-01

## 2024-10-16 LAB
CHOLEST SERPL-MCNC: 97 MG/DL
ESTIMATED AVERAGE GLUCOSE: 111 MG/DL
HBA1C MFR BLD HPLC: 5.5 %
HDLC SERPL-MCNC: 45 MG/DL
LDLC SERPL CALC-MCNC: 39 MG/DL
NONHDLC SERPL-MCNC: 52 MG/DL
TRIGL SERPL-MCNC: 52 MG/DL

## 2024-11-29 ENCOUNTER — RX RENEWAL (OUTPATIENT)
Age: 69
End: 2024-11-29

## 2024-12-02 RX ORDER — INHALER, ASSIST DEVICES
SPACER (EA) MISCELLANEOUS
Qty: 1 | Refills: 0 | Status: ACTIVE | COMMUNITY
Start: 1900-01-01 | End: 1900-01-01

## 2024-12-18 ENCOUNTER — APPOINTMENT (OUTPATIENT)
Dept: VASCULAR SURGERY | Facility: CLINIC | Age: 69
End: 2024-12-18
Payer: MEDICARE

## 2024-12-18 VITALS
HEART RATE: 73 BPM | DIASTOLIC BLOOD PRESSURE: 80 MMHG | WEIGHT: 200 LBS | SYSTOLIC BLOOD PRESSURE: 118 MMHG | BODY MASS INDEX: 32.14 KG/M2 | HEIGHT: 66 IN

## 2024-12-18 DIAGNOSIS — I73.9 PERIPHERAL VASCULAR DISEASE, UNSPECIFIED: ICD-10-CM

## 2024-12-18 PROCEDURE — 99213 OFFICE O/P EST LOW 20 MIN: CPT

## 2024-12-18 RX ORDER — SEMAGLUTIDE 1.7 MG/.75ML
INJECTION, SOLUTION SUBCUTANEOUS
Refills: 0 | Status: ACTIVE | COMMUNITY

## 2024-12-25 PROBLEM — F10.90 ALCOHOL USE: Status: ACTIVE | Noted: 2021-09-29

## 2025-01-09 ENCOUNTER — APPOINTMENT (OUTPATIENT)
Dept: HEPATOLOGY | Facility: CLINIC | Age: 70
End: 2025-01-09

## 2025-01-09 VITALS
SYSTOLIC BLOOD PRESSURE: 110 MMHG | WEIGHT: 200 LBS | HEIGHT: 65 IN | BODY MASS INDEX: 33.32 KG/M2 | DIASTOLIC BLOOD PRESSURE: 80 MMHG

## 2025-04-02 ENCOUNTER — NON-APPOINTMENT (OUTPATIENT)
Age: 70
End: 2025-04-02

## 2025-04-03 ENCOUNTER — RX RENEWAL (OUTPATIENT)
Age: 70
End: 2025-04-03

## 2025-04-03 ENCOUNTER — APPOINTMENT (OUTPATIENT)
Dept: HEPATOLOGY | Facility: CLINIC | Age: 70
End: 2025-04-03
Payer: MEDICARE

## 2025-04-03 VITALS
HEART RATE: 69 BPM | BODY MASS INDEX: 33.99 KG/M2 | DIASTOLIC BLOOD PRESSURE: 80 MMHG | WEIGHT: 204 LBS | SYSTOLIC BLOOD PRESSURE: 136 MMHG | HEIGHT: 65 IN | OXYGEN SATURATION: 97 %

## 2025-04-03 DIAGNOSIS — E66.01 MORBID (SEVERE) OBESITY DUE TO EXCESS CALORIES: ICD-10-CM

## 2025-04-03 DIAGNOSIS — K75.81 NONALCOHOLIC STEATOHEPATITIS (NASH): ICD-10-CM

## 2025-04-03 DIAGNOSIS — K74.00 HEPATIC FIBROSIS, UNSPECIFIED: ICD-10-CM

## 2025-04-03 PROCEDURE — G2211 COMPLEX E/M VISIT ADD ON: CPT

## 2025-04-03 PROCEDURE — 99214 OFFICE O/P EST MOD 30 MIN: CPT

## 2025-04-10 ENCOUNTER — APPOINTMENT (OUTPATIENT)
Dept: FAMILY MEDICINE | Facility: CLINIC | Age: 70
End: 2025-04-10

## 2025-04-10 VITALS — TEMPERATURE: 100.1 F

## 2025-04-10 DIAGNOSIS — R09.81 NASAL CONGESTION: ICD-10-CM

## 2025-04-10 LAB
CEPHEID COV-19: NEGATIVE
CEPHEID FLU A: NEGATIVE
CEPHEID FLU B: NEGATIVE
CEPHEID RSV: NEGATIVE

## 2025-04-10 PROCEDURE — 99214 OFFICE O/P EST MOD 30 MIN: CPT | Mod: 25

## 2025-04-10 PROCEDURE — G2211 COMPLEX E/M VISIT ADD ON: CPT

## 2025-04-10 PROCEDURE — 36415 COLL VENOUS BLD VENIPUNCTURE: CPT

## 2025-04-10 PROCEDURE — G0439: CPT

## 2025-04-10 PROCEDURE — 0241U: CPT | Mod: QW

## 2025-04-11 LAB
25(OH)D3 SERPL-MCNC: 55.8 NG/ML
ALBUMIN SERPL ELPH-MCNC: 4.6 G/DL
ALP BLD-CCNC: 71 U/L
ALT SERPL-CCNC: 36 U/L
ANION GAP SERPL CALC-SCNC: 12 MMOL/L
APPEARANCE: CLEAR
AST SERPL-CCNC: 28 U/L
BASOPHILS # BLD AUTO: 0.09 K/UL
BASOPHILS NFR BLD AUTO: 1.3 %
BILIRUB SERPL-MCNC: 0.6 MG/DL
BILIRUBIN URINE: NEGATIVE
BLOOD URINE: NEGATIVE
BUN SERPL-MCNC: 12 MG/DL
CALCIUM SERPL-MCNC: 9.3 MG/DL
CHLORIDE SERPL-SCNC: 102 MMOL/L
CHOLEST SERPL-MCNC: 109 MG/DL
CO2 SERPL-SCNC: 24 MMOL/L
COLOR: NORMAL
CREAT SERPL-MCNC: 0.76 MG/DL
EGFRCR SERPLBLD CKD-EPI 2021: 99 ML/MIN/1.73M2
EOSINOPHIL # BLD AUTO: 0.27 K/UL
EOSINOPHIL NFR BLD AUTO: 4 %
ESTIMATED AVERAGE GLUCOSE: 108 MG/DL
GLUCOSE QUALITATIVE U: NEGATIVE MG/DL
GLUCOSE SERPL-MCNC: 96 MG/DL
HBA1C MFR BLD HPLC: 5.4 %
HCT VFR BLD CALC: 46.2 %
HDLC SERPL-MCNC: 54 MG/DL
HGB BLD-MCNC: 15.6 G/DL
IMM GRANULOCYTES NFR BLD AUTO: 0.3 %
KETONES URINE: ABNORMAL MG/DL
LDLC SERPL-MCNC: 44 MG/DL
LEUKOCYTE ESTERASE URINE: NEGATIVE
LYMPHOCYTES # BLD AUTO: 1.47 K/UL
LYMPHOCYTES NFR BLD AUTO: 21.6 %
MAN DIFF?: NORMAL
MCHC RBC-ENTMCNC: 30.7 PG
MCHC RBC-ENTMCNC: 33.8 G/DL
MCV RBC AUTO: 90.9 FL
MONOCYTES # BLD AUTO: 0.59 K/UL
MONOCYTES NFR BLD AUTO: 8.7 %
NEUTROPHILS # BLD AUTO: 4.36 K/UL
NEUTROPHILS NFR BLD AUTO: 64.1 %
NITRITE URINE: NEGATIVE
NONHDLC SERPL-MCNC: 55 MG/DL
PH URINE: 6
PLATELET # BLD AUTO: 307 K/UL
POTASSIUM SERPL-SCNC: 4.2 MMOL/L
PROT SERPL-MCNC: 7.6 G/DL
PROTEIN URINE: NORMAL MG/DL
PSA FREE FLD-MCNC: 22 %
PSA FREE SERPL-MCNC: 0.55 NG/ML
PSA SERPL-MCNC: 2.47 NG/ML
RBC # BLD: 5.08 M/UL
RBC # FLD: 13.7 %
RESP PATH DNA+RNA PNL NPH NAA+NON-PROBE: NOT DETECTED
SARS-COV-2 RNA RESP QL NAA+PROBE: NOT DETECTED
SODIUM SERPL-SCNC: 138 MMOL/L
SPECIFIC GRAVITY URINE: 1.03
TRIGL SERPL-MCNC: 48 MG/DL
TSH SERPL-ACNC: 1.96 UIU/ML
UROBILINOGEN URINE: 1 MG/DL
WBC # FLD AUTO: 6.8 K/UL

## 2025-04-17 ENCOUNTER — RX RENEWAL (OUTPATIENT)
Age: 70
End: 2025-04-17

## 2025-05-05 ENCOUNTER — RESULT REVIEW (OUTPATIENT)
Age: 70
End: 2025-05-05

## 2025-06-05 ENCOUNTER — APPOINTMENT (OUTPATIENT)
Dept: HEPATOLOGY | Facility: CLINIC | Age: 70
End: 2025-06-05
Payer: MEDICARE

## 2025-06-05 VITALS
BODY MASS INDEX: 33.32 KG/M2 | HEART RATE: 65 BPM | HEIGHT: 65 IN | SYSTOLIC BLOOD PRESSURE: 136 MMHG | OXYGEN SATURATION: 95 % | WEIGHT: 200 LBS | DIASTOLIC BLOOD PRESSURE: 90 MMHG

## 2025-06-05 DIAGNOSIS — K75.81 NONALCOHOLIC STEATOHEPATITIS (NASH): ICD-10-CM

## 2025-06-05 DIAGNOSIS — K74.00 HEPATIC FIBROSIS, UNSPECIFIED: ICD-10-CM

## 2025-06-05 DIAGNOSIS — R73.02 IMPAIRED GLUCOSE TOLERANCE (ORAL): ICD-10-CM

## 2025-06-05 DIAGNOSIS — E66.01 MORBID (SEVERE) OBESITY DUE TO EXCESS CALORIES: ICD-10-CM

## 2025-06-05 PROCEDURE — G2211 COMPLEX E/M VISIT ADD ON: CPT

## 2025-06-05 PROCEDURE — 99214 OFFICE O/P EST MOD 30 MIN: CPT

## 2025-07-09 ENCOUNTER — APPOINTMENT (OUTPATIENT)
Dept: VASCULAR SURGERY | Facility: CLINIC | Age: 70
End: 2025-07-09
Payer: MEDICARE

## 2025-07-09 PROCEDURE — 93922 UPR/L XTREMITY ART 2 LEVELS: CPT

## 2025-07-09 PROCEDURE — 99214 OFFICE O/P EST MOD 30 MIN: CPT

## 2025-07-17 ENCOUNTER — RX RENEWAL (OUTPATIENT)
Age: 70
End: 2025-07-17